# Patient Record
Sex: FEMALE | Race: WHITE | NOT HISPANIC OR LATINO | Employment: FULL TIME | URBAN - METROPOLITAN AREA
[De-identification: names, ages, dates, MRNs, and addresses within clinical notes are randomized per-mention and may not be internally consistent; named-entity substitution may affect disease eponyms.]

---

## 2017-08-01 ENCOUNTER — CONVERSION ENCOUNTER (OUTPATIENT)
Dept: MAMMOGRAPHY | Facility: HOSPITAL | Age: 52
End: 2017-08-01

## 2020-12-29 ENCOUNTER — OFFICE VISIT CONVERTED (OUTPATIENT)
Dept: FAMILY MEDICINE CLINIC | Facility: CLINIC | Age: 55
End: 2020-12-29
Attending: NURSE PRACTITIONER

## 2020-12-29 ENCOUNTER — HOSPITAL ENCOUNTER (OUTPATIENT)
Dept: LAB | Facility: HOSPITAL | Age: 55
Discharge: HOME | End: 2020-12-29
Attending: NURSE PRACTITIONER

## 2020-12-29 ENCOUNTER — CONVERSION ENCOUNTER (OUTPATIENT)
Dept: FAMILY MEDICINE CLINIC | Facility: CLINIC | Age: 55
End: 2020-12-29

## 2020-12-29 LAB
ALBUMIN SERPL-MCNC: 4.1 G/DL (ref 3.5–5)
ALBUMIN/GLOB SERPL: 1.2 {RATIO} (ref 1.4–2.6)
ALP SERPL-CCNC: 139 U/L (ref 53–141)
ALT SERPL-CCNC: 48 U/L (ref 10–40)
ANION GAP SERPL CALC-SCNC: 16 MMOL/L (ref 8–19)
AST SERPL-CCNC: 43 U/L (ref 15–50)
BASOPHILS # BLD AUTO: 0.06 10*3/UL (ref 0–0.2)
BASOPHILS NFR BLD AUTO: 0.7 % (ref 0–3)
BILIRUB SERPL-MCNC: 0.31 MG/DL (ref 0.2–1.3)
BUN SERPL-MCNC: 13 MG/DL (ref 5–25)
BUN/CREAT SERPL: 12 {RATIO} (ref 6–20)
CALCIUM SERPL-MCNC: 10.2 MG/DL (ref 8.7–10.4)
CHLORIDE SERPL-SCNC: 103 MMOL/L (ref 99–111)
CONV ABS IMM GRAN: 0.03 10*3/UL (ref 0–0.2)
CONV CO2: 24 MMOL/L (ref 22–32)
CONV IMMATURE GRAN: 0.4 % (ref 0–1.8)
CONV TOTAL PROTEIN: 7.6 G/DL (ref 6.3–8.2)
CREAT UR-MCNC: 1.05 MG/DL (ref 0.5–0.9)
DEPRECATED RDW RBC AUTO: 43 FL (ref 36.4–46.3)
EOSINOPHIL # BLD AUTO: 0.1 10*3/UL (ref 0–0.7)
EOSINOPHIL # BLD AUTO: 1.2 % (ref 0–7)
ERYTHROCYTE [DISTWIDTH] IN BLOOD BY AUTOMATED COUNT: 12 % (ref 11.7–14.4)
GFR SERPLBLD BASED ON 1.73 SQ M-ARVRAT: >60 ML/MIN/{1.73_M2}
GLOBULIN UR ELPH-MCNC: 3.5 G/DL (ref 2–3.5)
GLUCOSE SERPL-MCNC: 81 MG/DL (ref 65–99)
HCT VFR BLD AUTO: 47 % (ref 37–47)
HGB BLD-MCNC: 15.8 G/DL (ref 12–16)
LYMPHOCYTES # BLD AUTO: 1.99 10*3/UL (ref 1–5)
LYMPHOCYTES NFR BLD AUTO: 24.6 % (ref 20–45)
MCH RBC QN AUTO: 32.3 PG (ref 27–31)
MCHC RBC AUTO-ENTMCNC: 33.6 G/DL (ref 33–37)
MCV RBC AUTO: 96.1 FL (ref 81–99)
MONOCYTES # BLD AUTO: 0.73 10*3/UL (ref 0.2–1.2)
MONOCYTES NFR BLD AUTO: 9 % (ref 3–10)
NEUTROPHILS # BLD AUTO: 5.18 10*3/UL (ref 2–8)
NEUTROPHILS NFR BLD AUTO: 64.1 % (ref 30–85)
NRBC CBCN: 0 % (ref 0–0.7)
OSMOLALITY SERPL CALC.SUM OF ELEC: 287 MOSM/KG (ref 273–304)
PLATELET # BLD AUTO: 332 10*3/UL (ref 130–400)
PMV BLD AUTO: 10.4 FL (ref 9.4–12.3)
POTASSIUM SERPL-SCNC: 4.3 MMOL/L (ref 3.5–5.3)
RBC # BLD AUTO: 4.89 10*6/UL (ref 4.2–5.4)
SODIUM SERPL-SCNC: 139 MMOL/L (ref 135–147)
T4 FREE SERPL-MCNC: 1.2 NG/DL (ref 0.9–1.8)
TSH SERPL-ACNC: 4.23 M[IU]/L (ref 0.27–4.2)
WBC # BLD AUTO: 8.09 10*3/UL (ref 4.8–10.8)

## 2021-04-13 ENCOUNTER — OFFICE VISIT CONVERTED (OUTPATIENT)
Dept: FAMILY MEDICINE CLINIC | Facility: CLINIC | Age: 56
End: 2021-04-13
Attending: NURSE PRACTITIONER

## 2021-04-13 ENCOUNTER — CONVERSION ENCOUNTER (OUTPATIENT)
Dept: FAMILY MEDICINE CLINIC | Facility: CLINIC | Age: 56
End: 2021-04-13

## 2021-04-13 ENCOUNTER — HOSPITAL ENCOUNTER (OUTPATIENT)
Dept: LAB | Facility: HOSPITAL | Age: 56
Discharge: HOME | End: 2021-04-13
Attending: NURSE PRACTITIONER

## 2021-04-13 LAB
CHOLEST SERPL-MCNC: 221 MG/DL (ref 107–200)
CHOLEST/HDLC SERPL: 3.4 {RATIO} (ref 3–6)
HDLC SERPL-MCNC: 65 MG/DL (ref 40–60)
LDLC SERPL CALC-MCNC: 132 MG/DL (ref 70–100)
TRIGL SERPL-MCNC: 122 MG/DL (ref 40–150)
VLDLC SERPL-MCNC: 24 MG/DL (ref 5–37)

## 2021-04-14 ENCOUNTER — HOSPITAL ENCOUNTER (OUTPATIENT)
Dept: FAMILY MEDICINE CLINIC | Facility: CLINIC | Age: 56
Discharge: HOME | End: 2021-04-14
Attending: NURSE PRACTITIONER

## 2021-04-19 LAB
CONV LAST MENSTURAL PERIOD: NORMAL
SPECIMEN SOURCE: NORMAL
SPECIMEN SOURCE: NORMAL
THIN PREP CVX: NORMAL

## 2021-05-10 NOTE — H&P
"   History and Physical      Patient Name: Cher Quezada   Patient ID: [id number]   Sex: Female   YOB: 1965    Primary Care Provider: Theresa LOGAN   Referring Provider: Theresa LOGAN    Visit Date: December 29, 2020    Provider: DESMOND Haro   Location: [facility]   Location Address: [address] [place] [address], [address]  [place], [place]  [id number]   Location Phone: [phone]          Chief Complaint  · new patient/est care      History Of Present Illness  Cher Quezada is a 55 year old /White female who presents for evaluation and treatment of:      Patient is here today to establish care with a new provider. Patient is requesting r/fs on all of her medications. Patient would like to get scheduled for a pap smear.    Patient had part of  colon removed due to C Diff in 2014. dr. zamora , states \"he told me there was no reason to have a colonoscopy becuse there is practically nothing left\"     Patient's previous PCP was Nik Hernandez.    Patient has a hx of     depression/anxiety- Cymbalta (r/f), Wellbutrin XL (r/f) has been on this combo for 2 years, working well   Bipolar depression-Abilify (r/f)  Migraine- Maxalt   arthritis- flexeril  hypothyroidism- levothyroxine (r/f)    denies any SI/HI       Past Medical History  Disease Name Date Onset Notes   Allergic rhinitis, chronic --  --    Arthritis --  --    Bipolar Disorder --  --    Constipation --  --    Hypothyroidism --  --    Migraine --  --    Mood disorder --  --    Obesity --  --    Obstructive sleep apnea --  --          Past Surgical History  Procedure Name Date Notes   Colon --  abdominal colectomy   Fissurectomy --  --    Gastric Bypass 2006 --    Hernia --  --    Ileostomy reversal --  --    Rectal surgery 1994 fistula   Tubal ligation --  --          Medication List  Name Date Started Instructions   Abilify 10 mg oral tablet 12/29/2020 take 1 tablet (10 mg) by oral route once " daily for 90 days   acetaminophen 650 mg oral tablet extended release  --    cyclobenzaprine 10 mg oral tablet  take 1 tablet by oral route 3 times a day as needed   Cymbalta 60 mg oral capsule,delayed release(DR/EC) 12/29/2020 take 1 capsule (60 mg) by oral route once daily for 90 days   levothyroxine 50 mcg oral tablet 12/29/2020 take 1 tablet (50 mcg) by oral route once daily for 90 days   Maxalt 10 mg oral tablet  take 1 tablet (10 mg) by oral route once, may repeat at 2 hour intervals; do not exceed 30 mg in 24 hours   promethazine 50 mg oral tablet  take 1 tablet (50 mg) by oral route every 6 hours as needed   Wellbutrin  mg oral tablet extended release 24 hr 12/29/2020 take 1 tablet (150 mg) by oral route once daily for 90 days         Allergy List  Allergen Name Date Reaction Notes   Imitrex --  --  --    NSAIDS --  --  --    sumatriptan --  --  --        Allergies Reconciled  Family Medical History  Disease Name Relative/Age Notes   Diabetes, unspecified type Father/  Mother/   Mother; Father   Family history of breast cancer Mother/60s   Mother/60s   -Father's Family History Unknown Father/   Father   -Mother's Family History Unknown Mother/   Mother         Social History  Finding Status Start/Stop Quantity Notes   Alcohol Never --/-- --  drinks no   Caffeine Current every day --/-- --  drinks regularly; coffee and tea; 5 or more times per day  drinks regularly; coffee and tea; 3-4 times per day   Second hand smoke exposure Never --/-- --  no   Tobacco Never --/-- --  never a smoker         Review of Systems  · Constitutional  o Denies  o : fatigue  · Eyes  o Denies  o : blurred vision, changes in vision  · HENT  o Denies  o : headaches  · Cardiovascular  o Denies  o : chest pain, irregular heart beats, rapid heart rate, dyspnea on exertion  · Respiratory  o Denies  o : shortness of breath, wheezing, cough  · Gastrointestinal  o Denies  o : nausea, vomiting, diarrhea, constipation, abdominal pain,  "blood in stools, melena  · Genitourinary  o Denies  o : frequency, dysuria, hematuria  · Integument  o Denies  o : rash, new skin lesions  · Musculoskeletal  o Denies  o : joint pain, joint swelling, muscle pain  · Endocrine  o Admits  o : central obesity  o Denies  o : polyuria, polydipsia      Vitals  Date Time BP Position Site L\R Cuff Size HR RR TEMP (F) WT  HT  BMI kg/m2 BSA m2 O2 Sat FR L/min FiO2        12/29/2020 11:35 /79 Sitting    87 - R 18  311lbs 8oz 5'  3\" 55.18 2.51 98 %  21%          Physical Examination  · Constitutional  o Appearance  o : well developed, well-nourished, no acute distress  · Head and Face  o Head  o : normocephalic, atraumatic  · Ears, Nose, Mouth and Throat  o Ears  o :   § External Ears  § : external auditory canal appearance normal, no discharge present  § Otoscopic Examination  § : tympanic membranes pearly white/gray bilaterally  o Nose  o :   § External Nose  § : no lesions noted  § Nasopharynx  § : no discharge present  o Oral Cavity  o :   § Oral Mucosa  § : oral mucosa light pink  o Throat  o :   § Oropharynx  § : tonsils without exudate, no palatal petechiae  · Neck  o Inspection/Palpation  o : normal appearance, no masses or tenderness, trachea midline  o Thyroid  o : gland size normal, nontender, no nodules or masses present on palpation  · Respiratory  o Respiratory Effort  o : breathing unlabored  o Inspection of Chest  o : chest rise symmetric bilaterally  o Auscultation of Lungs  o : clear to auscultation bilaterally throughout inspiration and expiration  · Cardiovascular  o Heart  o :   § Auscultation of Heart  § : regular rate and rhythm, no murmurs, gallops or rubs  o Peripheral Vascular System  o :   § Extremities  § : no edema  · Lymphatic  o Neck  o : no cervical lymphadenopathy, no supraclavicular lymphadenopathy  · Psychiatric  o Mood and Affect  o : mood normal, affect appropriate          Assessment  · Screening for " depression     V79.0/Z13.89  · Hypothyroidism     244.9/E03.9  · Bipolar Disorder     296.40  · Migraine     346.10/G43.009  · Arthritis     716.90/M19.90  · High risk medication use     V58.69/Z79.899      Plan  · Orders  o Annual depression screening, 15 minutes (, 85814) - V79.0/Z13.89 - 12/29/2020  o Thyroid Profile (04168, 91978, THYII) - 244.9/E03.9 - 12/29/2020  o CBC with Auto Diff [facility] (27356) - 346.10/G43.009, 244.9/E03.9, V58.69/Z79.899 - 12/29/2020  o CMP [facility] (35675) - 346.10/G43.009, 244.9/E03.9, V58.69/Z79.899 - 12/29/2020  o ACO-39: Current medications updated and reviewed (, 1159F) - - 12/29/2020  o ACO-18: Positive screen for clinical depression using a standardized tool and a follow-up plan documented () - - 12/29/2020  o ACO-14: Influenza immunization administered or previously received [facility] () - - 12/29/2020  · Medications  o Abilify 10 mg oral tablet   SIG: take 1 tablet (10 mg) by oral route once daily for 90 days   DISP: (90) Tablet with 1 refills  Prescribed on 12/29/2020     o Cymbalta 60 mg oral capsule,delayed release(DR/EC)   SIG: take 1 capsule (60 mg) by oral route once daily for 90 days   DISP: (90) Capsule with 1 refills  Prescribed on 12/29/2020     o levothyroxine 50 mcg oral tablet   SIG: take 1 tablet (50 mcg) by oral route once daily for 90 days   DISP: (90) Tablet with 1 refills  Prescribed on 12/29/2020     o Wellbutrin  mg oral tablet extended release 24 hr   SIG: take 1 tablet (150 mg) by oral route once daily for 90 days   DISP: (90) Tablet with 1 refills  Prescribed on 12/29/2020     o Medications have been Reconciled  o Transition of Care or Provider Policy  · Instructions  o Depression Screen completed and scanned into the EMR under the designated folder within the patient's documents.  o Patient is taking medications as prescribed and doing well.   o Take all medications as prescribed/directed.  o Patient was educated/instructed on their  diagnosis, treatment and medications prior to discharge from the clinic today.  o Patient instructed to seek medical attention urgently for new or worsening symptoms.  o Call the office with any concerns or questions.  o Chronic conditions reviewed and taken into consideration for today's treatment plan.  o Discussed Covid-19 precautions including, but not limited to, social distancing, avoid touching your face, and hand washing.   · Disposition  o Follow Up in 6 months     pt will call back and sched pap             Electronically Signed by: DESMOND Haro -Author on December 29, 2020 03:21:20 PM

## 2021-05-14 VITALS
HEART RATE: 78 BPM | DIASTOLIC BLOOD PRESSURE: 78 MMHG | OXYGEN SATURATION: 99 % | HEIGHT: 63 IN | BODY MASS INDEX: 51.91 KG/M2 | RESPIRATION RATE: 18 BRPM | WEIGHT: 293 LBS | SYSTOLIC BLOOD PRESSURE: 139 MMHG

## 2021-05-14 VITALS
DIASTOLIC BLOOD PRESSURE: 79 MMHG | OXYGEN SATURATION: 98 % | HEIGHT: 63 IN | WEIGHT: 293 LBS | SYSTOLIC BLOOD PRESSURE: 130 MMHG | RESPIRATION RATE: 18 BRPM | HEART RATE: 87 BPM | BODY MASS INDEX: 51.91 KG/M2

## 2021-05-14 NOTE — PROGRESS NOTES
Progress Note      Patient Name: Cher Quezada   Patient ID: [id number]   Sex: Female   YOB: 1965    Primary Care Provider: Theresa LOGAN   Referring Provider: Theresa LOGAN    Visit Date: April 13, 2021    Provider: DESMOND Haro   [place] [facility]   [place] Address[place] [address] [place] [address], [address]  [place], [place]  [id number]   Location Phone: [phone]          Chief Complaint  · WWE      History Of Present Illness  Cher Quezada is a 56 year old /White female who presents for evaluation and treatment of:      Patient is here today for a WWE/pap smear.    Patient has had a flu vaccine during this season.    Patient is scheduled for the COVID-19 vaccine next week.    Last pap smear was July 2017.    Patient has no complaints.    Patient's last period is unknown, pt had ablation.    pt due for mammo - admits family hx breast CA in mother       Past Medical History  Disease Name Date Onset Notes   Allergic rhinitis, chronic --  --    Arthritis --  --    Bipolar Disorder --  --    Constipation --  --    Hypothyroidism --  --    Migraine --  --    Mood disorder --  --    Obesity --  --    Obstructive sleep apnea --  --          Past Surgical History  Procedure Name Date Notes   Colon --  abdominal colectomy   Fissurectomy --  --    Gastric Bypass 2006 --    Hernia --  --    Ileostomy reversal --  --    Rectal surgery 1994 fistula   Tubal ligation --  --          Medication List  Name Date Started Instructions   Abilify 10 mg oral tablet 12/29/2020 take 1 tablet (10 mg) by oral route once daily for 90 days   acetaminophen 650 mg oral tablet extended release  --    cyclobenzaprine 10 mg oral tablet  take 1 tablet by oral route 3 times a day as needed   Cymbalta 60 mg oral capsule,delayed release(DR/EC) 12/29/2020 take 1 capsule (60 mg) by oral route once daily for 90 days   levothyroxine 50 mcg oral tablet 12/29/2020 take 1 tablet  (50 mcg) by oral route once daily for 90 days   Maxalt 10 mg oral tablet  take 1 tablet (10 mg) by oral route once, may repeat at 2 hour intervals; do not exceed 30 mg in 24 hours   promethazine 50 mg oral tablet  take 1 tablet (50 mg) by oral route every 6 hours as needed   Wellbutrin  mg oral tablet extended release 24 hr 12/29/2020 take 1 tablet (150 mg) by oral route once daily for 90 days         Allergy List  Allergen Name Date Reaction Notes   Imitrex --  --  --    NSAIDS --  --  --    sumatriptan --  --  --        Allergies Reconciled  Family Medical History  Disease Name Relative/Age Notes   Diabetes, unspecified type Father/  Mother/   Mother; Father   Family history of breast cancer Mother/60s   Mother/60s   -Father's Family History Unknown Father/   Father   -Mother's Family History Unknown Mother/   Mother         Social History  Finding Status Start/Stop Quantity Notes   Alcohol Never --/-- --  drinks no   Caffeine Current every day --/-- --  drinks regularly; coffee and tea; 5 or more times per day  drinks regularly; coffee and tea; 3-4 times per day   Second hand smoke exposure Never --/-- --  no   Tobacco Never --/-- --  never a smoker         Immunizations  NameDate Admin Mfg Trade Name Lot Number Route Inj VIS Given VIS Publication   [id number]/29/2020 SKB Fluzone Quadrivalent  NE NE 12/29/2020    Comments: [employer] Prescription Shot         Review of Systems  · Constitutional  o Denies  o : fatigue  · Eyes  o Denies  o : blurred vision, changes in vision  · HENT  o Denies  o : headaches  · Cardiovascular  o Denies  o : chest pain, irregular heart beats, rapid heart rate, dyspnea on exertion  · Respiratory  o Denies  o : shortness of breath, wheezing, cough  · Gastrointestinal  o Denies  o : nausea, vomiting, diarrhea, constipation, abdominal pain, blood in stools, melena  · Genitourinary  o Denies  o : frequency, dysuria, hematuria  · Integument  o Denies  o : rash, new skin  "lesions  · Musculoskeletal  o Denies  o : joint pain, joint swelling, muscle pain  · Endocrine  o Admits  o : central obesity  o Denies  o : polyuria, polydipsia      Vitals  Date Time BP Position Site L\R Cuff Size HR RR TEMP (F) WT  HT  BMI kg/m2 BSA m2 O2 Sat FR L/min FiO2        04/13/2021 09:23 /78 Sitting    78 - R 18  310lbs 0oz 5'  3\" 54.91 2.5 99 %  21%          Physical Examination  · Constitutional  o Appearance  o : well developed, well-nourished, no acute distress  · Head and Face  o Head  o : normocephalic, atraumatic  · Ears, Nose, Mouth and Throat  o Ears  o :   § External Ears  § : external auditory canal appearance normal, no discharge present  § Otoscopic Examination  § : tympanic membranes pearly white/gray bilaterally  o Nose  o :   § External Nose  § : no lesions noted  § Nasopharynx  § : no discharge present  o Oral Cavity  o :   § Oral Mucosa  § : oral mucosa light pink  o Throat  o :   § Oropharynx  § : tonsils without exudate, no palatal petechiae  · Neck  o Inspection/Palpation  o : normal appearance, no masses or tenderness, trachea midline  o Range of Motion  o : cervical range of motion within normal limits  o Thyroid  o : gland size normal, nontender, no nodules or masses present on palpation  · Respiratory  o Respiratory Effort  o : breathing unlabored  o Inspection of Chest  o : chest rise symmetric bilaterally  o Auscultation of Lungs  o : clear to auscultation bilaterally throughout inspiration and expiration  · Cardiovascular  o Heart  o :   § Auscultation of Heart  § : regular rate and rhythm, no murmurs, gallops or rubs  o Peripheral Vascular System  o :   § Carotid Arteries  § : normal pulses bilaterally, no bruits present  § Pedal Pulses  § : pulses 2 bilaterally  § Extremities  § : no edema  · Breasts  o Inspection of Breasts  o : breasts symmetrical, no skin changes, no deformities present, no discharge present  o Palpation of Breasts, Axillae  o : no masses present " on palpation, no breast tenderness  · Gastrointestinal  o Abdominal Examination  o : abdomen obese, nontender to palpation, tone normal without rigidity or guarding, no masses present, normal bowel sounds  · Genitourinary  o External Genitalia  o : no inflammation, no lesions present  o Vagina  o : normal vaginal vault, no discharge present, no inflammatory lesions present, no masses present  o Urethra  o :   § Urethral Meatus  § : no inflammation present  § Urethral Body  § : urethra palpation normal  o Bladder  o : nontender to palpation  o Cervix  o : only able to partially visualize due to pt discomfort with exam, appearance healthy, no lesions present, nontender to palpation, no discharges, no bleeding present, normal midline position  o Uterus  o : nontender to palpation, no masses present, position midline/midplane  o Adnexa  o : no tenderness or masses present on bimanual examination  o Anus  o : no inflammation or lesions present  o Perineum  o : perineum within normal limits  · Lymphatic  o Neck  o : no cervical lymphadenopathy, no supraclavicular lymphadenopathy  o Axilla  o : no lymphadenopathy present  o Groin  o : no lymphadenopathy present  · Neurologic  o Mental Status Examination  o :   § Orientation  § : grossly oriented to person, place and time  o Gait and Station  o : normal gait, able to stand without difficulty  · Psychiatric  o Judgement and Insight  o : judgment and insight intact  o Mood and Affect  o : mood normal, affect appropriate          Assessment  · Screening for lipid disorders     V77.91/Z13.220  · Screening for cervical cancer     V76.2/Z12.4  · Visit for screening mammogram     V76.12/Z12.31  · Pap Smear     V76.2/Z01.419  · Well woman exam     V72.31/Z01.419    Problems Reconciled  Plan  · Orders  o Lipid Panel [facility] (47210) - V77.91/Z13.220 - 04/13/2021  o Pap smear (17133) - V76.2/Z12.4 - 04/13/2021  o Screening Mammography; Bilateral 3D (57372, 20415, ) - V76.12/Z12.31 -  04/13/2021  o ACO-14: Influenza immunization administered or previously received [facility] () - - 04/13/2021  o ACO-39: Current medications updated and reviewed (1159F, ) - - 04/13/2021  · Medications  o Medications have been Reconciled  o Transition of Care or Provider Policy  · Instructions  o **Pap Test/Liquid Based:   o Thin Prep  o Source:   o Cervix  o ********  o **Perform Reflex Human Papilloma Virus (HPV) High Risk on this Pap (If atypical squamous cells of the undetermined signifigcance (ASCUS)/Atypical Glandular Cells of undetermined significance (AGCUS): Low Grade Squamous Intraepitheal lesion (LGSIL): **  o **Is this an annual PAP:  o Yes  o Patient is taking medications as prescribed and doing well.   o Patient was educated/instructed on their diagnosis, treatment and medications prior to discharge from the clinic today.  o Patient instructed to seek medical attention urgently for new or worsening symptoms.  o Call the office with any concerns or questions.  o Chronic conditions reviewed and taken into consideration for today's treatment plan.  · Disposition  o Call or Return if symptoms worsen or persist.  o Follow Up PRN            Electronically Signed by: DESMOND Haro -Author on April 13, 2021 03:33:32 PM

## 2021-05-22 ENCOUNTER — TRANSCRIBE ORDERS (OUTPATIENT)
Dept: FAMILY MEDICINE CLINIC | Facility: CLINIC | Age: 56
End: 2021-05-22

## 2021-05-22 DIAGNOSIS — Z12.31 ENCOUNTER FOR SCREENING MAMMOGRAM FOR MALIGNANT NEOPLASM OF BREAST: Primary | ICD-10-CM

## 2021-07-23 ENCOUNTER — HOSPITAL ENCOUNTER (OUTPATIENT)
Dept: MAMMOGRAPHY | Facility: HOSPITAL | Age: 56
Discharge: HOME | End: 2021-07-23
Admitting: NURSE PRACTITIONER

## 2021-07-23 DIAGNOSIS — Z12.31 ENCOUNTER FOR SCREENING MAMMOGRAM FOR MALIGNANT NEOPLASM OF BREAST: ICD-10-CM

## 2021-07-23 PROCEDURE — 77067 SCR MAMMO BI INCL CAD: CPT

## 2021-07-23 PROCEDURE — 77063 BREAST TOMOSYNTHESIS BI: CPT

## 2021-07-26 ENCOUNTER — TELEPHONE (OUTPATIENT)
Dept: FAMILY MEDICINE CLINIC | Facility: CLINIC | Age: 56
End: 2021-07-26

## 2021-08-09 DIAGNOSIS — F32.A DEPRESSION, UNSPECIFIED DEPRESSION TYPE: ICD-10-CM

## 2021-08-09 DIAGNOSIS — E03.9 HYPOTHYROIDISM, UNSPECIFIED TYPE: Primary | ICD-10-CM

## 2021-08-10 RX ORDER — BUPROPION HYDROCHLORIDE 150 MG/1
TABLET ORAL
Qty: 30 TABLET | Refills: 0 | OUTPATIENT
Start: 2021-08-10

## 2021-08-10 RX ORDER — LEVOTHYROXINE SODIUM 0.05 MG/1
TABLET ORAL
Qty: 30 TABLET | Refills: 0 | OUTPATIENT
Start: 2021-08-10

## 2021-08-10 RX ORDER — ARIPIPRAZOLE 10 MG/1
TABLET ORAL
Qty: 30 TABLET | Refills: 0 | OUTPATIENT
Start: 2021-08-10

## 2021-08-10 RX ORDER — DULOXETIN HYDROCHLORIDE 60 MG/1
CAPSULE, DELAYED RELEASE ORAL
Qty: 30 CAPSULE | Refills: 0 | OUTPATIENT
Start: 2021-08-10

## 2021-08-11 DIAGNOSIS — F33.0 MAJOR DEPRESSIVE DISORDER, RECURRENT, MILD (HCC): Primary | ICD-10-CM

## 2021-08-11 DIAGNOSIS — E03.9 HYPOTHYROIDISM, UNSPECIFIED TYPE: ICD-10-CM

## 2021-08-11 RX ORDER — PROMETHAZINE HCL 50 MG
50 TABLET ORAL DAILY PRN
COMMUNITY
End: 2022-02-01

## 2021-08-11 RX ORDER — ARIPIPRAZOLE 10 MG/1
10 TABLET ORAL DAILY
Qty: 30 TABLET | Refills: 0 | Status: SHIPPED | OUTPATIENT
Start: 2021-08-11 | End: 2021-08-18 | Stop reason: SDUPTHER

## 2021-08-11 RX ORDER — LORATADINE 10 MG/1
1 TABLET ORAL DAILY
COMMUNITY

## 2021-08-11 RX ORDER — DULOXETIN HYDROCHLORIDE 60 MG/1
60 CAPSULE, DELAYED RELEASE ORAL DAILY
Qty: 30 CAPSULE | Refills: 0 | Status: SHIPPED | OUTPATIENT
Start: 2021-08-11 | End: 2021-08-18 | Stop reason: SDUPTHER

## 2021-08-11 RX ORDER — BUPROPION HYDROCHLORIDE 150 MG/1
150 TABLET ORAL DAILY
COMMUNITY
End: 2021-08-11 | Stop reason: SDUPTHER

## 2021-08-11 RX ORDER — DULOXETIN HYDROCHLORIDE 60 MG/1
60 CAPSULE, DELAYED RELEASE ORAL DAILY
COMMUNITY
End: 2021-08-11 | Stop reason: SDUPTHER

## 2021-08-11 RX ORDER — RIZATRIPTAN BENZOATE 10 MG/1
10 TABLET ORAL DAILY PRN
COMMUNITY
End: 2022-08-02

## 2021-08-11 RX ORDER — LEVOTHYROXINE SODIUM 0.05 MG/1
50 TABLET ORAL DAILY
Qty: 30 TABLET | Refills: 0 | Status: SHIPPED | OUTPATIENT
Start: 2021-08-11 | End: 2021-08-18 | Stop reason: SDUPTHER

## 2021-08-11 RX ORDER — UREA 10 %
50 LOTION (ML) TOPICAL DAILY
COMMUNITY

## 2021-08-11 RX ORDER — ARIPIPRAZOLE 10 MG/1
10 TABLET ORAL DAILY
COMMUNITY
End: 2021-08-11 | Stop reason: SDUPTHER

## 2021-08-11 RX ORDER — CYCLOBENZAPRINE HCL 10 MG
10 TABLET ORAL DAILY
COMMUNITY
End: 2022-08-02

## 2021-08-11 RX ORDER — BUPROPION HYDROCHLORIDE 150 MG/1
150 TABLET ORAL DAILY
Qty: 30 TABLET | Refills: 0 | Status: SHIPPED | OUTPATIENT
Start: 2021-08-11 | End: 2021-08-18 | Stop reason: SDUPTHER

## 2021-08-11 RX ORDER — LEVOTHYROXINE SODIUM 0.05 MG/1
50 TABLET ORAL DAILY
COMMUNITY
End: 2021-08-11 | Stop reason: SDUPTHER

## 2021-08-18 ENCOUNTER — LAB (OUTPATIENT)
Dept: LAB | Facility: HOSPITAL | Age: 56
End: 2021-08-18

## 2021-08-18 ENCOUNTER — OFFICE VISIT (OUTPATIENT)
Dept: FAMILY MEDICINE CLINIC | Facility: CLINIC | Age: 56
End: 2021-08-18

## 2021-08-18 VITALS
DIASTOLIC BLOOD PRESSURE: 75 MMHG | WEIGHT: 293 LBS | RESPIRATION RATE: 16 BRPM | SYSTOLIC BLOOD PRESSURE: 132 MMHG | BODY MASS INDEX: 51.91 KG/M2 | OXYGEN SATURATION: 97 % | HEIGHT: 63 IN | HEART RATE: 93 BPM

## 2021-08-18 DIAGNOSIS — Z53.20 COLON CANCER SCREENING DECLINED: ICD-10-CM

## 2021-08-18 DIAGNOSIS — E03.9 HYPOTHYROIDISM, UNSPECIFIED TYPE: Primary | ICD-10-CM

## 2021-08-18 DIAGNOSIS — F33.0 MAJOR DEPRESSIVE DISORDER, RECURRENT, MILD (HCC): ICD-10-CM

## 2021-08-18 LAB
T4 FREE SERPL-MCNC: 1.22 NG/DL (ref 0.93–1.7)
TSH SERPL DL<=0.05 MIU/L-ACNC: 4.39 UIU/ML (ref 0.27–4.2)

## 2021-08-18 PROCEDURE — 36415 COLL VENOUS BLD VENIPUNCTURE: CPT | Performed by: NURSE PRACTITIONER

## 2021-08-18 PROCEDURE — 84443 ASSAY THYROID STIM HORMONE: CPT | Performed by: NURSE PRACTITIONER

## 2021-08-18 PROCEDURE — 99214 OFFICE O/P EST MOD 30 MIN: CPT | Performed by: NURSE PRACTITIONER

## 2021-08-18 PROCEDURE — 84439 ASSAY OF FREE THYROXINE: CPT | Performed by: NURSE PRACTITIONER

## 2021-08-18 RX ORDER — LEVOTHYROXINE SODIUM 0.05 MG/1
50 TABLET ORAL DAILY
Qty: 30 TABLET | Refills: 1 | Status: SHIPPED | OUTPATIENT
Start: 2021-08-18 | End: 2021-08-19

## 2021-08-18 RX ORDER — DULOXETIN HYDROCHLORIDE 60 MG/1
60 CAPSULE, DELAYED RELEASE ORAL DAILY
Qty: 30 CAPSULE | Refills: 1 | Status: SHIPPED | OUTPATIENT
Start: 2021-08-18 | End: 2021-11-04 | Stop reason: SDUPTHER

## 2021-08-18 RX ORDER — BUPROPION HYDROCHLORIDE 150 MG/1
150 TABLET ORAL DAILY
Qty: 30 TABLET | Refills: 1 | Status: SHIPPED | OUTPATIENT
Start: 2021-08-18 | End: 2021-11-04 | Stop reason: SDUPTHER

## 2021-08-18 RX ORDER — ARIPIPRAZOLE 10 MG/1
10 TABLET ORAL DAILY
Qty: 30 TABLET | Refills: 1 | Status: SHIPPED | OUTPATIENT
Start: 2021-08-18 | End: 2021-11-04 | Stop reason: SDUPTHER

## 2021-08-18 NOTE — PROGRESS NOTES
Chief Complaint  Anxiety, Depression, and Hypothyroidism    Subjective          Cher Quezada presents to [facility] for   History of Present Illness    Patient is here for a follow up on depression, anxiety, hypothyroidism. Patient is requesting r/fs on medications.  States is doing well on all medications.    Discussed colorectal screening with patient : pt states hx of C-diff that required removal of colon, per pt report the surgeon told her she would not need colonoscopy's because she has been no colon Left.  So pt declines.   Medical History  Past Medical History:   Diagnosis Date   • Arthritis    • Bipolar disorder (CMS/HCC)    • Chronic allergic rhinitis    • Constipation    • Hypothyroidism    • Migraine    • Mood disorder (CMS/HCC)    • Obesity    • Obstructive sleep apnea      Surgical History  Past Surgical History:   Procedure Laterality Date   • ANAL FISSURECTOMY     • COLON SURGERY      ABDOMINAL   • GASTRIC BYPASS  2006   • HERNIA REPAIR     • ILEOSTOMY REVISION      REVERSAL   • RECTAL SURGERY  1994    FISTULA   • TUBAL ABDOMINAL LIGATION       Social History  Social History     Socioeconomic History   • Marital status:      Spouse name: Not on file   • Number of children: Not on file   • Years of education: Not on file   • Highest education level: Not on file   Tobacco Use   • Smoking status: Never Smoker   • Smokeless tobacco: Never Used   • Tobacco comment: NO SECOND HAND SMOKE EXPOSURE   Vaping Use   • Vaping Use: Never used   Substance and Sexual Activity   • Alcohol use: Never   • Drug use: Never   • Sexual activity: Defer       Current Outpatient Medications:   •  ARIPiprazole (ABILIFY) 10 MG tablet, Take 1 tablet by mouth Daily., Disp: 30 tablet, Rfl: 1  •  buPROPion XL (WELLBUTRIN XL) 150 MG 24 hr tablet, Take 1 tablet by mouth Daily., Disp: 30 tablet, Rfl: 1  •  Cholecalciferol 25 MCG (1000 UT) capsule, Take 1,000 Units by mouth Daily., Disp:  ", Rfl:   •  Cyanocobalamin ER 2000 MCG tablet controlled-release, Take 2,000 mcg by mouth Daily., Disp: , Rfl:   •  cyclobenzaprine (FLEXERIL) 10 MG tablet, Take 10 mg by mouth Daily., Disp: , Rfl:   •  DULoxetine (CYMBALTA) 60 MG capsule, Take 1 capsule by mouth Daily., Disp: 30 capsule, Rfl: 1  •  levothyroxine (SYNTHROID, LEVOTHROID) 50 MCG tablet, Take 1 tablet by mouth Daily., Disp: 30 tablet, Rfl: 1  •  loratadine (CLARITIN) 10 MG tablet, Take 1 tablet by mouth Daily., Disp: , Rfl:   •  promethazine (PHENERGAN) 50 MG tablet, Take 50 mg by mouth Daily As Needed., Disp: , Rfl:   •  rizatriptan (Maxalt) 10 MG tablet, Take 10 mg by mouth Daily As Needed., Disp: , Rfl:   •  Zinc Sulfate 220 (50 Zn) MG tablet, Take 50 mg by mouth Daily., Disp: , Rfl:     Review of Systems     Objective     /75 (BP Location: Left arm, Patient Position: Sitting, Cuff Size: Adult)   Pulse 93   Resp 16   Ht 160 cm (63\")   Wt (!) 142 kg (313 lb 12.8 oz)   SpO2 97%   BMI 55.59 kg/m²     Body mass index is 55.59 kg/m².    Physical Exam  Vitals reviewed.   Constitutional:       Appearance: Normal appearance. She is well-developed.   HENT:      Head: Normocephalic and atraumatic.      Right Ear: External ear normal.      Left Ear: External ear normal.   Eyes:      Conjunctiva/sclera: Conjunctivae normal.      Pupils: Pupils are equal, round, and reactive to light.   Cardiovascular:      Rate and Rhythm: Normal rate and regular rhythm.      Heart sounds: No murmur heard.   No friction rub. No gallop.    Pulmonary:      Effort: Pulmonary effort is normal.      Breath sounds: Normal breath sounds. No wheezing or rhonchi.   Skin:     General: Skin is warm and dry.   Neurological:      Mental Status: She is alert and oriented to person, place, and time.      Cranial Nerves: No cranial nerve deficit.   Psychiatric:         Mood and Affect: Mood and affect normal.         Behavior: Behavior normal.         Thought Content: Thought " content normal.         Judgment: Judgment normal.         Result Review :     The following data was reviewed by: DESMOND Haro on 08/18/2021:    CMP    CMP 12/29/20   Glucose 81   BUN 13   Creatinine 1.05 (A)   Sodium 139   Potassium 4.3   Chloride 103   Calcium 10.2   Albumin 4.1   Total Bilirubin 0.31   Alkaline Phosphatase 139   AST (SGOT) 43   ALT (SGPT) 48 (A)   (A) Abnormal value            CBC    CBC 12/29/20   WBC 8.09   RBC 4.89   Hemoglobin 15.8   Hematocrit 47.0   MCV 96.1   MCH 32.3 (A)   MCHC 33.6   RDW 12.0   Platelets 332   (A) Abnormal value            Lipid Panel    Lipid Panel 4/13/21   Total Cholesterol 221 (A)   Triglycerides 122   HDL Cholesterol 65 (A)   VLDL Cholesterol 24   LDL Cholesterol  132 (A)   (A) Abnormal value       Comments are available for some flowsheets but are not being displayed.           TSH    TSH 12/29/20   TSH 4.230 (A)   (A) Abnormal value                               Assessment:  Diagnoses and all orders for this visit:    1. Hypothyroidism, unspecified type (Primary)  Assessment & Plan:  Patient states she is doing well, however she has not had labs since December 2020, at that time TSH was very mildly elevated.  We will refill current dose of Synthroid 50 mcg and recheck thyroid levels today.    Orders:  -     levothyroxine (SYNTHROID, LEVOTHROID) 50 MCG tablet; Take 1 tablet by mouth Daily.  Dispense: 30 tablet; Refill: 1  -     TSH+Free T4    2. Major depressive disorder, recurrent, mild (CMS/HCC)  Assessment & Plan:  Patient's depression is recurrent and is mild without psychosis. Their depression is currently in full remission and the condition is Stable, well controlled. This will be reassessed at the next regular appointment. F/U as described:patient will continue current medication therapy.    Orders:  -     ARIPiprazole (ABILIFY) 10 MG tablet; Take 1 tablet by mouth Daily.  Dispense: 30 tablet; Refill: 1  -     buPROPion XL (WELLBUTRIN XL) 150 MG  24 hr tablet; Take 1 tablet by mouth Daily.  Dispense: 30 tablet; Refill: 1  -     DULoxetine (CYMBALTA) 60 MG capsule; Take 1 capsule by mouth Daily.  Dispense: 30 capsule; Refill: 1    3. Colon cancer screening declined              Follow Up     No follow-ups on file.    Patient was given instructions and counseling regarding her condition or for health maintenance advice. Please see specific information pulled into the AVS if appropriate.     Theresa Chacon, DESMOND  08/18/2021

## 2021-08-18 NOTE — ASSESSMENT & PLAN NOTE
Patient's depression is recurrent and is mild without psychosis. Their depression is currently in full remission and the condition is Stable, well controlled. This will be reassessed at the next regular appointment. F/U as described:patient will continue current medication therapy.

## 2021-08-18 NOTE — ASSESSMENT & PLAN NOTE
Patient states she is doing well, however she has not had labs since December 2020, at that time TSH was very mildly elevated.  We will refill current dose of Synthroid 50 mcg and recheck thyroid levels today.

## 2021-08-19 ENCOUNTER — TELEPHONE (OUTPATIENT)
Dept: FAMILY MEDICINE CLINIC | Facility: CLINIC | Age: 56
End: 2021-08-19

## 2021-08-19 DIAGNOSIS — E03.9 HYPOTHYROIDISM, UNSPECIFIED TYPE: Primary | ICD-10-CM

## 2021-08-19 RX ORDER — LEVOTHYROXINE SODIUM 0.07 MG/1
75 TABLET ORAL DAILY
Qty: 30 TABLET | Refills: 1 | Status: SHIPPED | OUTPATIENT
Start: 2021-08-19 | End: 2021-10-07 | Stop reason: SDUPTHER

## 2021-09-30 ENCOUNTER — LAB (OUTPATIENT)
Dept: LAB | Facility: HOSPITAL | Age: 56
End: 2021-09-30

## 2021-09-30 DIAGNOSIS — E03.9 HYPOTHYROIDISM, UNSPECIFIED TYPE: ICD-10-CM

## 2021-09-30 LAB
T4 FREE SERPL-MCNC: 1.24 NG/DL (ref 0.93–1.7)
TSH SERPL DL<=0.05 MIU/L-ACNC: 2.85 UIU/ML (ref 0.27–4.2)

## 2021-09-30 PROCEDURE — 84439 ASSAY OF FREE THYROXINE: CPT

## 2021-09-30 PROCEDURE — 36415 COLL VENOUS BLD VENIPUNCTURE: CPT

## 2021-09-30 PROCEDURE — 84443 ASSAY THYROID STIM HORMONE: CPT

## 2021-10-07 ENCOUNTER — TELEPHONE (OUTPATIENT)
Dept: FAMILY MEDICINE CLINIC | Facility: CLINIC | Age: 56
End: 2021-10-07

## 2021-10-07 DIAGNOSIS — E03.9 HYPOTHYROIDISM, UNSPECIFIED TYPE: ICD-10-CM

## 2021-10-07 RX ORDER — LEVOTHYROXINE SODIUM 0.07 MG/1
75 TABLET ORAL DAILY
Qty: 90 TABLET | Refills: 1 | Status: SHIPPED | OUTPATIENT
Start: 2021-10-07 | End: 2022-01-31 | Stop reason: SDUPTHER

## 2021-11-04 ENCOUNTER — TELEPHONE (OUTPATIENT)
Dept: FAMILY MEDICINE CLINIC | Facility: CLINIC | Age: 56
End: 2021-11-04

## 2021-11-04 DIAGNOSIS — E03.9 HYPOTHYROIDISM, UNSPECIFIED TYPE: ICD-10-CM

## 2021-11-04 DIAGNOSIS — F33.0 MAJOR DEPRESSIVE DISORDER, RECURRENT, MILD (HCC): ICD-10-CM

## 2021-11-04 RX ORDER — LEVOTHYROXINE SODIUM 0.07 MG/1
75 TABLET ORAL DAILY
Qty: 90 TABLET | Refills: 1 | Status: CANCELLED | OUTPATIENT
Start: 2021-11-04

## 2021-11-04 NOTE — TELEPHONE ENCOUNTER
Caller: Cher Quezada    Relationship: Self        Requested Prescriptions:   Requested Prescriptions     Pending Prescriptions Disp Refills   • levothyroxine (Synthroid) 75 MCG tablet 90 tablet 1     Sig: Take 1 tablet by mouth Daily.   • DULoxetine (CYMBALTA) 60 MG capsule 30 capsule 1     Sig: Take 1 capsule by mouth Daily.   • buPROPion XL (WELLBUTRIN XL) 150 MG 24 hr tablet 30 tablet 1     Sig: Take 1 tablet by mouth Daily.   • ARIPiprazole (ABILIFY) 10 MG tablet 30 tablet 1     Sig: Take 1 tablet by mouth Daily.        Pharmacy where request should be sent: [facility]   [address] [place] [address]   [phone]     Additional details provided by patient: PATIENT IS NEEDING A REFILL. PATIENT IS WANTING TO GET A 90 DAY SUPPLY.     Best call back number: [phone]     Does the patient have less than a 3 day supply:  [] Yes  [x] No    Milton Man Rep   11/04/21 12:57 EDT

## 2021-11-04 NOTE — TELEPHONE ENCOUNTER
Levothyroxine was the only prescription that was sent in for 90 days with a refill. The rest were sent in on 08/18/2021 for 30 days with 1 refill. I put in for 90 but if you want a refill please add

## 2021-11-08 RX ORDER — DULOXETIN HYDROCHLORIDE 60 MG/1
60 CAPSULE, DELAYED RELEASE ORAL DAILY
Qty: 90 CAPSULE | Refills: 0 | Status: SHIPPED | OUTPATIENT
Start: 2021-11-08 | End: 2022-01-31 | Stop reason: SDUPTHER

## 2021-11-08 RX ORDER — ARIPIPRAZOLE 10 MG/1
10 TABLET ORAL DAILY
Qty: 90 TABLET | Refills: 0 | Status: SHIPPED | OUTPATIENT
Start: 2021-11-08 | End: 2022-01-31 | Stop reason: SDUPTHER

## 2021-11-08 RX ORDER — BUPROPION HYDROCHLORIDE 150 MG/1
150 TABLET ORAL DAILY
Qty: 90 TABLET | Refills: 0 | Status: SHIPPED | OUTPATIENT
Start: 2021-11-08 | End: 2022-01-31 | Stop reason: SDUPTHER

## 2022-01-31 RX ORDER — HYDROCODONE BITARTRATE AND ACETAMINOPHEN 7.5; 325 MG/1; MG/1
TABLET ORAL
COMMUNITY
End: 2022-02-01

## 2022-01-31 RX ORDER — GABAPENTIN 100 MG/1
CAPSULE ORAL
COMMUNITY
End: 2022-02-01

## 2022-02-01 ENCOUNTER — OFFICE VISIT (OUTPATIENT)
Dept: FAMILY MEDICINE CLINIC | Facility: CLINIC | Age: 57
End: 2022-02-01

## 2022-02-01 VITALS
DIASTOLIC BLOOD PRESSURE: 74 MMHG | OXYGEN SATURATION: 97 % | SYSTOLIC BLOOD PRESSURE: 128 MMHG | HEART RATE: 84 BPM | HEIGHT: 63 IN | BODY MASS INDEX: 51.91 KG/M2 | WEIGHT: 293 LBS

## 2022-02-01 DIAGNOSIS — Z13.220 SCREENING FOR LIPID DISORDERS: ICD-10-CM

## 2022-02-01 DIAGNOSIS — F33.0 MAJOR DEPRESSIVE DISORDER, RECURRENT, MILD: ICD-10-CM

## 2022-02-01 DIAGNOSIS — Z01.89 ROUTINE LAB DRAW: ICD-10-CM

## 2022-02-01 DIAGNOSIS — E03.9 HYPOTHYROIDISM, UNSPECIFIED TYPE: Primary | ICD-10-CM

## 2022-02-01 DIAGNOSIS — Z11.59 NEED FOR HEPATITIS C SCREENING TEST: ICD-10-CM

## 2022-02-01 DIAGNOSIS — Z53.20 COLON CANCER SCREENING DECLINED: ICD-10-CM

## 2022-02-01 PROCEDURE — 99214 OFFICE O/P EST MOD 30 MIN: CPT | Performed by: NURSE PRACTITIONER

## 2022-02-01 RX ORDER — LEVOTHYROXINE SODIUM 0.07 MG/1
75 TABLET ORAL DAILY
Qty: 30 TABLET | Refills: 5 | Status: SHIPPED | OUTPATIENT
Start: 2022-02-01 | End: 2022-08-02 | Stop reason: SDUPTHER

## 2022-02-01 RX ORDER — BUPROPION HYDROCHLORIDE 150 MG/1
150 TABLET ORAL DAILY
Qty: 30 TABLET | Refills: 5 | Status: SHIPPED | OUTPATIENT
Start: 2022-02-01 | End: 2022-08-02 | Stop reason: SDUPTHER

## 2022-02-01 RX ORDER — DULOXETIN HYDROCHLORIDE 60 MG/1
60 CAPSULE, DELAYED RELEASE ORAL DAILY
Qty: 30 CAPSULE | Refills: 5 | Status: SHIPPED | OUTPATIENT
Start: 2022-02-01 | End: 2022-08-02 | Stop reason: SDUPTHER

## 2022-02-01 RX ORDER — ARIPIPRAZOLE 10 MG/1
10 TABLET ORAL DAILY
Qty: 30 TABLET | Refills: 5 | Status: SHIPPED | OUTPATIENT
Start: 2022-02-01 | End: 2022-08-02 | Stop reason: SDUPTHER

## 2022-02-01 NOTE — ASSESSMENT & PLAN NOTE
Patient states that she has had extensive colon surgery in for general told her that she did not need any colonoscopies due to the extent of her surgeries.

## 2022-02-01 NOTE — PROGRESS NOTES
Chief Complaint  Follow-up thyroid, depression, migraines  Subjective          Cher Quezada presents to [facility] for   History of Present Illness    Patient presents today to follow-up on depression (wellbutrin, cymbalta, abilify ), Hypothyroidism (levothyroxine), Allergies (claritin), and Migraine (maxalt as needed ).  States she is doing well overall, does need refills.  Also due for labs.  Medical History  Past Medical History:   Diagnosis Date   • Arthritis    • Bipolar disorder (HCC)    • Chronic allergic rhinitis    • Constipation    • Hypothyroidism    • Migraine    • Mood disorder (HCC)    • Obesity    • Obstructive sleep apnea      Surgical History  Past Surgical History:   Procedure Laterality Date   • ANAL FISSURECTOMY     • COLON SURGERY      ABDOMINAL   • GASTRIC BYPASS  2006   • HERNIA REPAIR     • ILEOSTOMY REVISION      REVERSAL   • RECTAL SURGERY  1994    FISTULA   • TUBAL ABDOMINAL LIGATION       Social History  Social History     Socioeconomic History   • Marital status:    Tobacco Use   • Smoking status: Never Smoker   • Smokeless tobacco: Never Used   • Tobacco comment: NO SECOND HAND SMOKE EXPOSURE   Vaping Use   • Vaping Use: Never used   Substance and Sexual Activity   • Alcohol use: Never   • Drug use: Never   • Sexual activity: Defer       Current Outpatient Medications:   •  Cholecalciferol 25 MCG (1000 UT) capsule, Take 1,000 Units by mouth Daily., Disp: , Rfl:   •  Cyanocobalamin ER 2000 MCG tablet controlled-release, Take 2,000 mcg by mouth Daily., Disp: , Rfl:   •  cyclobenzaprine (FLEXERIL) 10 MG tablet, Take 10 mg by mouth Daily., Disp: , Rfl:   •  loratadine (CLARITIN) 10 MG tablet, Take 1 tablet by mouth Daily., Disp: , Rfl:   •  rizatriptan (Maxalt) 10 MG tablet, Take 10 mg by mouth Daily As Needed., Disp: , Rfl:   •  Zinc Sulfate 220 (50 Zn) MG tablet, Take 50 mg by mouth Daily., Disp: , Rfl:   •  ARIPiprazole (ABILIFY) 10 MG  "tablet, Take 1 tablet by mouth Daily., Disp: 30 tablet, Rfl: 5  •  buPROPion XL (WELLBUTRIN XL) 150 MG 24 hr tablet, Take 1 tablet by mouth Daily., Disp: 30 tablet, Rfl: 5  •  DULoxetine (CYMBALTA) 60 MG capsule, Take 1 capsule by mouth Daily., Disp: 30 capsule, Rfl: 5  •  levothyroxine (Synthroid) 75 MCG tablet, Take 1 tablet by mouth Daily., Disp: 30 tablet, Rfl: 5    Review of Systems     Objective     /74   Pulse 84   Ht 160 cm (63\")   Wt (!) 142 kg (313 lb)   SpO2 97%   BMI 55.45 kg/m²     Body mass index is 55.45 kg/m².    Physical Exam  Vitals reviewed.   Constitutional:       Appearance: Normal appearance. She is well-developed. She is obese.   HENT:      Head: Normocephalic and atraumatic.      Right Ear: External ear normal.      Left Ear: External ear normal.   Eyes:      Conjunctiva/sclera: Conjunctivae normal.      Pupils: Pupils are equal, round, and reactive to light.   Cardiovascular:      Rate and Rhythm: Normal rate and regular rhythm.      Heart sounds: No murmur heard.  No friction rub. No gallop.    Pulmonary:      Effort: Pulmonary effort is normal.      Breath sounds: Normal breath sounds. No wheezing or rhonchi.   Skin:     General: Skin is warm and dry.   Neurological:      Mental Status: She is alert and oriented to person, place, and time.      Cranial Nerves: No cranial nerve deficit.   Psychiatric:         Mood and Affect: Mood and affect normal.         Behavior: Behavior normal.         Thought Content: Thought content normal.         Judgment: Judgment normal.         Result Review :     The following data was reviewed by: DESMOND Haro on 02/01/2022:            Lipid Panel    Lipid Panel 4/13/21   Total Cholesterol 221 (A)   Triglycerides 122   HDL Cholesterol 65 (A)   VLDL Cholesterol 24   LDL Cholesterol  132 (A)   (A) Abnormal value       Comments are available for some flowsheets but are not being displayed.           TSH    TSH 8/18/21 9/30/21   TSH 4.390 (A) " 2.850   (A) Abnormal value                               Assessment:  Diagnoses and all orders for this visit:    1. Hypothyroidism, unspecified type (Primary)  -     levothyroxine (Synthroid) 75 MCG tablet; Take 1 tablet by mouth Daily.  Dispense: 30 tablet; Refill: 5  -     Thyroid Profile II; Future    2. Major depressive disorder, recurrent, mild (HCC)  -     ARIPiprazole (ABILIFY) 10 MG tablet; Take 1 tablet by mouth Daily.  Dispense: 30 tablet; Refill: 5  -     buPROPion XL (WELLBUTRIN XL) 150 MG 24 hr tablet; Take 1 tablet by mouth Daily.  Dispense: 30 tablet; Refill: 5  -     DULoxetine (CYMBALTA) 60 MG capsule; Take 1 capsule by mouth Daily.  Dispense: 30 capsule; Refill: 5    3. Routine lab draw  -     CBC w AUTO Differential; Future  -     Comprehensive metabolic panel; Future    4. Screening for lipid disorders  -     Lipid panel; Future    5. Need for hepatitis C screening test  -     Hepatitis C antibody; Future    6. Colon cancer screening declined  Assessment & Plan:  Patient states that she has had extensive colon surgery in for general told her that she did not need any colonoscopies due to the extent of her surgeries.                Follow Up     Return in about 6 months (around 8/1/2022).    Patient was given instructions and counseling regarding her condition or for health maintenance advice. Please see specific information pulled into the AVS if appropriate.     Theresa Chacon, APRN  02/01/2022

## 2022-02-11 ENCOUNTER — LAB (OUTPATIENT)
Dept: LAB | Facility: HOSPITAL | Age: 57
End: 2022-02-11

## 2022-02-11 DIAGNOSIS — Z01.89 ROUTINE LAB DRAW: ICD-10-CM

## 2022-02-11 DIAGNOSIS — E03.9 HYPOTHYROIDISM, UNSPECIFIED TYPE: ICD-10-CM

## 2022-02-11 DIAGNOSIS — Z13.220 SCREENING FOR LIPID DISORDERS: ICD-10-CM

## 2022-02-11 DIAGNOSIS — Z11.59 NEED FOR HEPATITIS C SCREENING TEST: ICD-10-CM

## 2022-02-11 LAB
ALBUMIN SERPL-MCNC: 4 G/DL (ref 3.5–5.2)
ALBUMIN/GLOB SERPL: 1.3 G/DL
ALP SERPL-CCNC: 122 U/L (ref 39–117)
ALT SERPL W P-5'-P-CCNC: 30 U/L (ref 1–33)
ANION GAP SERPL CALCULATED.3IONS-SCNC: 10.5 MMOL/L (ref 5–15)
AST SERPL-CCNC: 20 U/L (ref 1–32)
BASOPHILS # BLD AUTO: 0.03 10*3/MM3 (ref 0–0.2)
BASOPHILS NFR BLD AUTO: 0.5 % (ref 0–1.5)
BILIRUB SERPL-MCNC: 0.4 MG/DL (ref 0–1.2)
BUN SERPL-MCNC: 11 MG/DL (ref 6–20)
BUN/CREAT SERPL: 11.2 (ref 7–25)
CALCIUM SPEC-SCNC: 9.4 MG/DL (ref 8.6–10.5)
CHLORIDE SERPL-SCNC: 104 MMOL/L (ref 98–107)
CHOLEST SERPL-MCNC: 234 MG/DL (ref 0–200)
CO2 SERPL-SCNC: 22.5 MMOL/L (ref 22–29)
CREAT SERPL-MCNC: 0.98 MG/DL (ref 0.57–1)
DEPRECATED RDW RBC AUTO: 41.3 FL (ref 37–54)
EOSINOPHIL # BLD AUTO: 0.1 10*3/MM3 (ref 0–0.4)
EOSINOPHIL NFR BLD AUTO: 1.8 % (ref 0.3–6.2)
ERYTHROCYTE [DISTWIDTH] IN BLOOD BY AUTOMATED COUNT: 12 % (ref 12.3–15.4)
GFR SERPL CREATININE-BSD FRML MDRD: 59 ML/MIN/1.73
GLOBULIN UR ELPH-MCNC: 3.2 GM/DL
GLUCOSE SERPL-MCNC: 128 MG/DL (ref 65–99)
HCT VFR BLD AUTO: 44.9 % (ref 34–46.6)
HCV AB SER DONR QL: NORMAL
HDLC SERPL-MCNC: 62 MG/DL (ref 40–60)
HGB BLD-MCNC: 15.2 G/DL (ref 12–15.9)
IMM GRANULOCYTES # BLD AUTO: 0.01 10*3/MM3 (ref 0–0.05)
IMM GRANULOCYTES NFR BLD AUTO: 0.2 % (ref 0–0.5)
LDLC SERPL CALC-MCNC: 152 MG/DL (ref 0–100)
LDLC/HDLC SERPL: 2.4 {RATIO}
LYMPHOCYTES # BLD AUTO: 1.62 10*3/MM3 (ref 0.7–3.1)
LYMPHOCYTES NFR BLD AUTO: 29.5 % (ref 19.6–45.3)
MCH RBC QN AUTO: 32.1 PG (ref 26.6–33)
MCHC RBC AUTO-ENTMCNC: 33.9 G/DL (ref 31.5–35.7)
MCV RBC AUTO: 94.9 FL (ref 79–97)
MONOCYTES # BLD AUTO: 0.4 10*3/MM3 (ref 0.1–0.9)
MONOCYTES NFR BLD AUTO: 7.3 % (ref 5–12)
NEUTROPHILS NFR BLD AUTO: 3.33 10*3/MM3 (ref 1.7–7)
NEUTROPHILS NFR BLD AUTO: 60.7 % (ref 42.7–76)
NRBC BLD AUTO-RTO: 0 /100 WBC (ref 0–0.2)
PLATELET # BLD AUTO: 313 10*3/MM3 (ref 140–450)
PMV BLD AUTO: 10.8 FL (ref 6–12)
POTASSIUM SERPL-SCNC: 4.2 MMOL/L (ref 3.5–5.2)
PROT SERPL-MCNC: 7.2 G/DL (ref 6–8.5)
RBC # BLD AUTO: 4.73 10*6/MM3 (ref 3.77–5.28)
SODIUM SERPL-SCNC: 137 MMOL/L (ref 136–145)
TRIGL SERPL-MCNC: 116 MG/DL (ref 0–150)
VLDLC SERPL-MCNC: 20 MG/DL (ref 5–40)
WBC NRBC COR # BLD: 5.49 10*3/MM3 (ref 3.4–10.8)

## 2022-02-11 PROCEDURE — 80050 GENERAL HEALTH PANEL: CPT

## 2022-02-11 PROCEDURE — 84480 ASSAY TRIIODOTHYRONINE (T3): CPT

## 2022-02-11 PROCEDURE — 86803 HEPATITIS C AB TEST: CPT

## 2022-02-11 PROCEDURE — 84479 ASSAY OF THYROID (T3 OR T4): CPT

## 2022-02-11 PROCEDURE — 80061 LIPID PANEL: CPT

## 2022-02-11 PROCEDURE — 84436 ASSAY OF TOTAL THYROXINE: CPT

## 2022-02-11 PROCEDURE — 36415 COLL VENOUS BLD VENIPUNCTURE: CPT

## 2022-02-12 LAB
FT4I SERPL CALC-MCNC: 2.1 (ref 1.2–4.9)
T3 SERPL-MCNC: 101 NG/DL (ref 71–180)
T3RU NFR SERPL: 29 % (ref 24–39)
T4 SERPL-MCNC: 7.2 UG/DL (ref 4.5–12)
TSH SERPL DL<=0.005 MIU/L-ACNC: 3.42 UIU/ML (ref 0.45–4.5)

## 2022-02-17 ENCOUNTER — TELEPHONE (OUTPATIENT)
Dept: FAMILY MEDICINE CLINIC | Facility: CLINIC | Age: 57
End: 2022-02-17

## 2022-02-17 DIAGNOSIS — R73.09 ELEVATED GLUCOSE: Primary | ICD-10-CM

## 2022-02-17 NOTE — TELEPHONE ENCOUNTER
----- Message from Huong Nelson MA sent at 2/17/2022 11:45 AM EST -----    ----- Message -----  From: Kristen Shaw APRN  Sent: 2/15/2022   3:49 PM EST  To: Huong Nelson MA, Steffanie Blood    Glucose elevated, add hemoglobin A1c.  Cholesterol worse, is patient willing to begin cholesterol medication?  Otherwise normal labs

## 2022-05-31 ENCOUNTER — TELEPHONE (OUTPATIENT)
Dept: FAMILY MEDICINE CLINIC | Facility: CLINIC | Age: 57
End: 2022-05-31

## 2022-05-31 DIAGNOSIS — R73.09 ELEVATED GLUCOSE: Primary | ICD-10-CM

## 2022-05-31 NOTE — TELEPHONE ENCOUNTER
It looks like this originally ordered in February but was not done. I have placed a new order for an A1C. Did you want patient to have any additional labs?

## 2022-05-31 NOTE — TELEPHONE ENCOUNTER
Caller: Cher Quezada    Relationship: Self    Best call back number: [phone]     What orders are you requesting (i.e. lab or imaging):LABS     In what timeframe would the patient need to come in:ASAP     Where will you receive your lab/imaging services: OFFICE      Additional notes: PATIENT WOULD LIKE TO RECEIVE A 1 C LABS PLEASE CALL AND ADVISE PATIENT WHEN LABS ARE PUT IN          Vascular Surgery Consult    Consulting attending: Dr. Albrecht    Vascular surgery consulted as patient recently underwent RLE Angio with balloon angioplasty of SFA, popliteal, and peroneal arteries and popliteal stents x 3. HPI reviewed as below. Found in ED with Hgb of 5.7, hemodynamically stable and 2 U prBC ordered per ED. Patient seen and examined at bedside. Right foot warm with tenderness at toes 2-5. Patient explains that pain has been the same since his revascularization. Reperfusion pain was explained to patient, and patient confirms having been told this. Daughter who is at bedside concerned for infection. She does note that patient's leg has improved greatly in appearance compared to before the procedure. Denies headache, nausea, fever, chest pain, or difficulty breathing. Primary team and daughter inquires about AC / Antiplatelets considering recent stents and GIB for which he is being admitted.       HPI:  89 y/o male with PMHx of HTN, HLD, CAD, HFpEF, s/p Right CEA for Carotid artery disease, ESRD on HD 2d/week via LUE fistula, s/p flecainide w/ ILR placed 6/2020, AS s/p TAVR, PAD with RLE revasc on 10/2020, on Eliquis and Plavix, anemia requiring prior transfusions, presents to ED with daughter (who works here as a nurse), due to anemia.  Patient has had a few days of dark stools. He also endorses RLE pain that has been present for some time.  Daughter says it has improved significantly since revascularization.  Otherwise denies pain, bleeding, SOB, chest pain, abdominal pain or fevers.  Denies other pertinent medical problems.  Denies any overt substance use. (12 Nov 2020 17:42)        PAST MEDICAL HISTORY:  GI bleeding    Aortic stenosis    ESRD on dialysis    Risk factors for obstructive sleep apnea    Anemia    RICHARDS (dyspnea on exertion)    VT (ventricular tachycardia)    HTN (hypertension)    CAD (coronary artery disease)    CKD (chronic kidney disease)    Arrhythmia    AV block, 1st degree    DM (diabetes mellitus)          PAST SURGICAL HISTORY:  S/P TAVR (transcatheter aortic valve replacement)    H/O carotid endarterectomy    A-V fistula    H/O angioplasty    H/O left knee surgery    H/O circumcision          MEDICATIONS:  acetaminophen   Tablet .. 650 milliGRAM(s) Oral every 6 hours  atorvastatin 80 milliGRAM(s) Oral at bedtime  cloNIDine 0.1 milliGRAM(s) Oral at bedtime  doxazosin 1 milliGRAM(s) Oral at bedtime  DULoxetine 60 milliGRAM(s) Oral daily  furosemide   Injectable 40 milliGRAM(s) IV Push once  hydrALAZINE  Oral Tab/Cap - Peds 50 milliGRAM(s) Oral two times a day before meals  isosorbide   mononitrate ER Tablet (IMDUR) 30 milliGRAM(s) Oral daily  multivitamin 1 Tablet(s) Oral daily  NIFEdipine XL 60 milliGRAM(s) Oral at bedtime  NIFEdipine XL 90 milliGRAM(s) Oral daily  pantoprazole  Injectable 40 milliGRAM(s) IV Push daily  sevelamer carbonate 800 milliGRAM(s) Oral three times a day with meals  traMADol 25 milliGRAM(s) Oral every 6 hours PRN        ALLERGIES:  Plavix (Hives)  Toprol-XL (Rash)        VITALS & I/Os:  Vital Signs Last 24 Hrs  T(C): 36.7 (12 Nov 2020 18:24), Max: 36.7 (12 Nov 2020 18:24)  T(F): 98.1 (12 Nov 2020 18:24), Max: 98.1 (12 Nov 2020 18:24)  HR: 85 (12 Nov 2020 18:24) (78 - 85)  BP: 134/60 (12 Nov 2020 18:24) (134/60 - 147/52)  BP(mean): --  RR: 18 (12 Nov 2020 18:24) (18 - 18)  SpO2: 98% (12 Nov 2020 18:24) (98% - 99%)    I&O's Summary        PHYSICAL EXAM:  General: No acute distress, though uncomfortable due to RLE pain.   Respiratory: Nonlabored, equal chest rise  Cardiovascular: Regular rate  Abdominal: Soft, nondistended, nontender. No rebound or guarding.   Extremities: Warm and well perfused. Digits with mild redness and severe tenderness of toes 2-5. Overlying skin debris No bleeding, ischemic tissue, or purulence appreciated. DP with triphasic doppler signals.         LABS:                        5.7    9.45  )-----------( 243      ( 12 Nov 2020 16:19 )             19.6     11-12    136  |  98  |  112.0<H>  ----------------------------<  154<H>  5.1   |  22.0  |  5.16<H>    Ca    8.8      12 Nov 2020 16:19    TPro  5.4<L>  /  Alb  3.1<L>  /  TBili  0.2<L>  /  DBili  x   /  AST  18  /  ALT  10  /  AlkPhos  87  11-12    Lactate:    PT/INR - ( 12 Nov 2020 16:19 )   PT: 15.2 sec;   INR: 1.33 ratio         PTT - ( 12 Nov 2020 16:19 )  PTT:34.4 sec    CARDIAC MARKERS ( 12 Nov 2020 16:19 )  x     / 0.13 ng/mL / x     / x     / x              IMAGING:  No new pertinent imaging,.

## 2022-06-02 ENCOUNTER — LAB (OUTPATIENT)
Dept: LAB | Facility: HOSPITAL | Age: 57
End: 2022-06-02

## 2022-06-02 DIAGNOSIS — R73.09 ELEVATED GLUCOSE: ICD-10-CM

## 2022-06-02 LAB — HBA1C MFR BLD: 6 % (ref 4.8–5.6)

## 2022-06-02 PROCEDURE — 83036 HEMOGLOBIN GLYCOSYLATED A1C: CPT

## 2022-06-02 PROCEDURE — 36415 COLL VENOUS BLD VENIPUNCTURE: CPT

## 2022-06-06 ENCOUNTER — TELEPHONE (OUTPATIENT)
Dept: FAMILY MEDICINE CLINIC | Facility: CLINIC | Age: 57
End: 2022-06-06

## 2022-06-06 DIAGNOSIS — R73.9 HIGH BLOOD SUGAR: Primary | ICD-10-CM

## 2022-06-06 NOTE — TELEPHONE ENCOUNTER
----- Message from DESMOND Botello sent at 6/6/2022  7:31 AM EDT -----  A1c consistent with diagnosis of prediabetes, patient needs to work on diet changes, decreasing carbs and sugars, and weight loss.  We will recheck A1c in 6 months

## 2022-08-02 ENCOUNTER — OFFICE VISIT (OUTPATIENT)
Dept: FAMILY MEDICINE CLINIC | Facility: CLINIC | Age: 57
End: 2022-08-02

## 2022-08-02 VITALS
OXYGEN SATURATION: 98 % | SYSTOLIC BLOOD PRESSURE: 128 MMHG | HEIGHT: 63 IN | DIASTOLIC BLOOD PRESSURE: 78 MMHG | BODY MASS INDEX: 51.91 KG/M2 | WEIGHT: 293 LBS | HEART RATE: 92 BPM

## 2022-08-02 DIAGNOSIS — Z12.31 BREAST CANCER SCREENING BY MAMMOGRAM: ICD-10-CM

## 2022-08-02 DIAGNOSIS — E55.9 VITAMIN D DEFICIENCY: ICD-10-CM

## 2022-08-02 DIAGNOSIS — F33.0 MAJOR DEPRESSIVE DISORDER, RECURRENT, MILD: ICD-10-CM

## 2022-08-02 DIAGNOSIS — Z13.220 SCREENING FOR LIPID DISORDERS: ICD-10-CM

## 2022-08-02 DIAGNOSIS — E03.9 HYPOTHYROIDISM, UNSPECIFIED TYPE: Primary | ICD-10-CM

## 2022-08-02 DIAGNOSIS — Z01.89 ROUTINE LAB DRAW: ICD-10-CM

## 2022-08-02 PROCEDURE — 99214 OFFICE O/P EST MOD 30 MIN: CPT | Performed by: NURSE PRACTITIONER

## 2022-08-02 RX ORDER — ARIPIPRAZOLE 10 MG/1
10 TABLET ORAL DAILY
Qty: 30 TABLET | Refills: 5 | Status: SHIPPED | OUTPATIENT
Start: 2022-08-02 | End: 2023-02-06 | Stop reason: SDUPTHER

## 2022-08-02 RX ORDER — DULOXETIN HYDROCHLORIDE 60 MG/1
60 CAPSULE, DELAYED RELEASE ORAL DAILY
Qty: 30 CAPSULE | Refills: 5 | Status: SHIPPED | OUTPATIENT
Start: 2022-08-02 | End: 2023-02-06 | Stop reason: SDUPTHER

## 2022-08-02 RX ORDER — LEVOTHYROXINE SODIUM 0.07 MG/1
75 TABLET ORAL DAILY
Qty: 30 TABLET | Refills: 5 | Status: SHIPPED | OUTPATIENT
Start: 2022-08-02 | End: 2023-02-06 | Stop reason: SDUPTHER

## 2022-08-02 RX ORDER — BUPROPION HYDROCHLORIDE 150 MG/1
150 TABLET ORAL DAILY
Qty: 30 TABLET | Refills: 5 | Status: SHIPPED | OUTPATIENT
Start: 2022-08-02 | End: 2023-02-06 | Stop reason: SDUPTHER

## 2022-08-02 NOTE — PROGRESS NOTES
"Chief Complaint  Depression (Abilify, Wellbutrin, Cymbalta) and Hypothyroidism (Levothyroxine 75mcg)    SUBJECTIVE  Cher Quezada presents to [facility]     Patient presents today to follow-up on chronic conditions including depression (Abilify, Wellbutrin, Cymbalta) and Hypothyroidism (Levothyroxine 75mcg).  States she is doing well overall, no complaints    History of Present Illness  Past Medical History:   Diagnosis Date   • Arthritis    • Bipolar disorder (HCC)    • Chronic allergic rhinitis    • Constipation    • Hypothyroidism    • Migraine    • Mood disorder (HCC)    • Obesity    • Obstructive sleep apnea       Family History   Problem Relation Age of Onset   • Diabetes Mother    • Breast cancer Mother 60   • Diabetes Father       Past Surgical History:   Procedure Laterality Date   • ANAL FISSURECTOMY     • COLON SURGERY      ABDOMINAL   • GASTRIC BYPASS  2006   • HERNIA REPAIR     • ILEOSTOMY REVISION      REVERSAL   • RECTAL SURGERY  1994    FISTULA   • TUBAL ABDOMINAL LIGATION          Current Outpatient Medications:   •  ARIPiprazole (ABILIFY) 10 MG tablet, Take 1 tablet by mouth Daily., Disp: 30 tablet, Rfl: 5  •  buPROPion XL (WELLBUTRIN XL) 150 MG 24 hr tablet, Take 1 tablet by mouth Daily., Disp: 30 tablet, Rfl: 5  •  Cholecalciferol 25 MCG (1000 UT) capsule, Take 1,000 Units by mouth Daily., Disp: , Rfl:   •  Cyanocobalamin ER 2000 MCG tablet controlled-release, Take 2,000 mcg by mouth Daily., Disp: , Rfl:   •  DULoxetine (CYMBALTA) 60 MG capsule, Take 1 capsule by mouth Daily., Disp: 30 capsule, Rfl: 5  •  levothyroxine (Synthroid) 75 MCG tablet, Take 1 tablet by mouth Daily., Disp: 30 tablet, Rfl: 5  •  loratadine (CLARITIN) 10 MG tablet, Take 1 tablet by mouth Daily., Disp: , Rfl:   •  Zinc Sulfate 220 (50 Zn) MG tablet, Take 50 mg by mouth Daily., Disp: , Rfl:     OBJECTIVE  Vital Signs:   /78   Pulse 92   Ht 160 cm (63\")   Wt (!) 142 kg (314 " "lb)   SpO2 98%   BMI 55.62 kg/m²    Estimated body mass index is 55.62 kg/m² as calculated from the following:    Height as of this encounter: 160 cm (63\").    Weight as of this encounter: 142 kg (314 lb).     Wt Readings from Last 3 Encounters:   08/02/22 (!) 142 kg (314 lb)   02/01/22 (!) 142 kg (313 lb)   08/18/21 (!) 142 kg (313 lb 12.8 oz)     BP Readings from Last 3 Encounters:   08/02/22 128/78   02/01/22 128/74   08/18/21 132/75       Physical Exam  Vitals reviewed.   Constitutional:       Appearance: Normal appearance. She is well-developed.   HENT:      Head: Normocephalic and atraumatic.      Right Ear: External ear normal.      Left Ear: External ear normal.   Eyes:      Conjunctiva/sclera: Conjunctivae normal.      Pupils: Pupils are equal, round, and reactive to light.   Cardiovascular:      Rate and Rhythm: Normal rate and regular rhythm.      Heart sounds: No murmur heard.    No friction rub. No gallop.   Pulmonary:      Effort: Pulmonary effort is normal.      Breath sounds: Normal breath sounds. No wheezing or rhonchi.   Skin:     General: Skin is warm and dry.   Neurological:      Mental Status: She is alert and oriented to person, place, and time.      Cranial Nerves: No cranial nerve deficit.   Psychiatric:         Mood and Affect: Mood and affect normal.         Behavior: Behavior normal.         Thought Content: Thought content normal.         Judgment: Judgment normal.          Result Review     CMP    CMP 2/11/22   Glucose 128 (A)   BUN 11   Creatinine 0.98   eGFR Non African Am 59 (A)   Sodium 137   Potassium 4.2   Chloride 104   Calcium 9.4   Albumin 4.00   Total Bilirubin 0.4   Alkaline Phosphatase 122 (A)   AST (SGOT) 20   ALT (SGPT) 30   (A) Abnormal value            CBC    CBC 2/11/22   WBC 5.49   RBC 4.73   Hemoglobin 15.2   Hematocrit 44.9   MCV 94.9   MCH 32.1   MCHC 33.9   RDW 12.0 (A)   Platelets 313   (A) Abnormal value            Lipid Panel    Lipid Panel 2/11/22   Total " Cholesterol 234 (A)   Triglycerides 116   HDL Cholesterol 62 (A)   VLDL Cholesterol 20   LDL Cholesterol  152 (A)   LDL/HDL Ratio 2.40   (A) Abnormal value            TSH    TSH 8/18/21 9/30/21 2/11/22   TSH 4.390 (A) 2.850 3.420   (A) Abnormal value            Most Recent A1C    HGBA1C Most Recent 6/2/22   Hemoglobin A1C 6.00 (A)   (A) Abnormal value              No Images in the past 120 days found..     The above data has been reviewed by DESMOND Haro 08/02/2022 11:02 EDT.          Patient Care Team:  Theresa Chacon APRN as PCP - General (Nurse Practitioner)           ASSESSMENT & PLAN    Diagnoses and all orders for this visit:    1. Hypothyroidism, unspecified type (Primary)  Assessment & Plan:  Controlled on levothyroxine, continue current dose    Orders:  -     TSH+Free T4; Future  -     levothyroxine (Synthroid) 75 MCG tablet; Take 1 tablet by mouth Daily.  Dispense: 30 tablet; Refill: 5    2. Screening for lipid disorders  -     Lipid panel; Future    3. Vitamin D deficiency  -     Vitamin D 25 hydroxy; Future    4. Routine lab draw  -     CBC w AUTO Differential; Future  -     Comprehensive metabolic panel; Future  -     Lipid panel; Future  -     TSH+Free T4; Future  -     Vitamin D 25 hydroxy; Future    5. Major depressive disorder, recurrent, mild (HCC)  Assessment & Plan:  Patient's symptoms are well controlled with medication, continue current dose    Orders:  -     ARIPiprazole (ABILIFY) 10 MG tablet; Take 1 tablet by mouth Daily.  Dispense: 30 tablet; Refill: 5  -     buPROPion XL (WELLBUTRIN XL) 150 MG 24 hr tablet; Take 1 tablet by mouth Daily.  Dispense: 30 tablet; Refill: 5  -     DULoxetine (CYMBALTA) 60 MG capsule; Take 1 capsule by mouth Daily.  Dispense: 30 capsule; Refill: 5    6. Breast cancer screening by mammogram  -     Mammo Screening Digital Tomosynthesis Bilateral With CAD; Future       Tobacco Use: Low Risk    • Smoking Tobacco Use: Never Smoker   • Smokeless Tobacco  Use: Never Used       Follow Up       Recommend attempted weight loss, healthy diet and exercise      Return in about 6 months (around 2/2/2023).        Patient was given instructions and counseling regarding her condition or for health maintenance advice. Please see specific information pulled into the AVS if appropriate.   I have reviewed information obtained and documented by others and I have confirmed the accuracy of this documented note.    Theresa Chacon, APRN

## 2022-08-24 ENCOUNTER — LAB (OUTPATIENT)
Dept: LAB | Facility: HOSPITAL | Age: 57
End: 2022-08-24

## 2022-08-24 DIAGNOSIS — E55.9 VITAMIN D DEFICIENCY: ICD-10-CM

## 2022-08-24 DIAGNOSIS — E03.9 HYPOTHYROIDISM, UNSPECIFIED TYPE: ICD-10-CM

## 2022-08-24 DIAGNOSIS — R73.09 ELEVATED GLUCOSE: ICD-10-CM

## 2022-08-24 DIAGNOSIS — Z13.220 SCREENING FOR LIPID DISORDERS: ICD-10-CM

## 2022-08-24 DIAGNOSIS — Z01.89 ROUTINE LAB DRAW: ICD-10-CM

## 2022-08-24 LAB
25(OH)D3 SERPL-MCNC: 42.6 NG/ML (ref 30–100)
ALBUMIN SERPL-MCNC: 3.9 G/DL (ref 3.5–5.2)
ALBUMIN/GLOB SERPL: 1.1 G/DL
ALP SERPL-CCNC: 124 U/L (ref 39–117)
ALT SERPL W P-5'-P-CCNC: 36 U/L (ref 1–33)
ANION GAP SERPL CALCULATED.3IONS-SCNC: 13.5 MMOL/L (ref 5–15)
AST SERPL-CCNC: 26 U/L (ref 1–32)
BASOPHILS # BLD AUTO: 0.03 10*3/MM3 (ref 0–0.2)
BASOPHILS NFR BLD AUTO: 0.5 % (ref 0–1.5)
BILIRUB SERPL-MCNC: 0.4 MG/DL (ref 0–1.2)
BUN SERPL-MCNC: 10 MG/DL (ref 6–20)
BUN/CREAT SERPL: 9.7 (ref 7–25)
CALCIUM SPEC-SCNC: 9.4 MG/DL (ref 8.6–10.5)
CHLORIDE SERPL-SCNC: 105 MMOL/L (ref 98–107)
CHOLEST SERPL-MCNC: 234 MG/DL (ref 0–200)
CO2 SERPL-SCNC: 20.5 MMOL/L (ref 22–29)
CREAT SERPL-MCNC: 1.03 MG/DL (ref 0.57–1)
DEPRECATED RDW RBC AUTO: 38.7 FL (ref 37–54)
EGFRCR SERPLBLD CKD-EPI 2021: 64.3 ML/MIN/1.73
EOSINOPHIL # BLD AUTO: 0.08 10*3/MM3 (ref 0–0.4)
EOSINOPHIL NFR BLD AUTO: 1.4 % (ref 0.3–6.2)
ERYTHROCYTE [DISTWIDTH] IN BLOOD BY AUTOMATED COUNT: 11.5 % (ref 12.3–15.4)
GLOBULIN UR ELPH-MCNC: 3.5 GM/DL
GLUCOSE SERPL-MCNC: 143 MG/DL (ref 65–99)
HCT VFR BLD AUTO: 44 % (ref 34–46.6)
HDLC SERPL-MCNC: 65 MG/DL (ref 40–60)
HGB BLD-MCNC: 15.2 G/DL (ref 12–15.9)
IMM GRANULOCYTES # BLD AUTO: 0.01 10*3/MM3 (ref 0–0.05)
IMM GRANULOCYTES NFR BLD AUTO: 0.2 % (ref 0–0.5)
LDLC SERPL CALC-MCNC: 149 MG/DL (ref 0–100)
LDLC/HDLC SERPL: 2.25 {RATIO}
LYMPHOCYTES # BLD AUTO: 1.58 10*3/MM3 (ref 0.7–3.1)
LYMPHOCYTES NFR BLD AUTO: 27 % (ref 19.6–45.3)
MCH RBC QN AUTO: 32.3 PG (ref 26.6–33)
MCHC RBC AUTO-ENTMCNC: 34.5 G/DL (ref 31.5–35.7)
MCV RBC AUTO: 93.6 FL (ref 79–97)
MONOCYTES # BLD AUTO: 0.46 10*3/MM3 (ref 0.1–0.9)
MONOCYTES NFR BLD AUTO: 7.9 % (ref 5–12)
NEUTROPHILS NFR BLD AUTO: 3.69 10*3/MM3 (ref 1.7–7)
NEUTROPHILS NFR BLD AUTO: 63 % (ref 42.7–76)
NRBC BLD AUTO-RTO: 0 /100 WBC (ref 0–0.2)
PLATELET # BLD AUTO: 347 10*3/MM3 (ref 140–450)
PMV BLD AUTO: 10.4 FL (ref 6–12)
POTASSIUM SERPL-SCNC: 4.4 MMOL/L (ref 3.5–5.2)
PROT SERPL-MCNC: 7.4 G/DL (ref 6–8.5)
RBC # BLD AUTO: 4.7 10*6/MM3 (ref 3.77–5.28)
SODIUM SERPL-SCNC: 139 MMOL/L (ref 136–145)
T4 FREE SERPL-MCNC: 1.1 NG/DL (ref 0.93–1.7)
TRIGL SERPL-MCNC: 113 MG/DL (ref 0–150)
TSH SERPL DL<=0.05 MIU/L-ACNC: 3.9 UIU/ML (ref 0.27–4.2)
VLDLC SERPL-MCNC: 20 MG/DL (ref 5–40)
WBC NRBC COR # BLD: 5.85 10*3/MM3 (ref 3.4–10.8)

## 2022-08-24 PROCEDURE — 84439 ASSAY OF FREE THYROXINE: CPT

## 2022-08-24 PROCEDURE — 83036 HEMOGLOBIN GLYCOSYLATED A1C: CPT

## 2022-08-24 PROCEDURE — 80050 GENERAL HEALTH PANEL: CPT

## 2022-08-24 PROCEDURE — 36415 COLL VENOUS BLD VENIPUNCTURE: CPT

## 2022-08-24 PROCEDURE — 80061 LIPID PANEL: CPT

## 2022-08-24 PROCEDURE — 82306 VITAMIN D 25 HYDROXY: CPT

## 2022-08-25 DIAGNOSIS — R73.09 ELEVATED GLUCOSE: ICD-10-CM

## 2022-08-25 DIAGNOSIS — E78.5 HYPERLIPIDEMIA, UNSPECIFIED HYPERLIPIDEMIA TYPE: ICD-10-CM

## 2022-08-25 DIAGNOSIS — R74.8 ELEVATED LIVER ENZYMES: Primary | ICD-10-CM

## 2022-08-25 LAB — HBA1C MFR BLD: 6.2 % (ref 4.8–5.6)

## 2022-08-25 RX ORDER — ROSUVASTATIN CALCIUM 5 MG/1
5 TABLET, COATED ORAL DAILY
Qty: 90 TABLET | Refills: 1 | Status: SHIPPED | OUTPATIENT
Start: 2022-08-25 | End: 2023-02-06 | Stop reason: SDUPTHER

## 2022-08-29 DIAGNOSIS — R73.09 ELEVATED GLUCOSE: Primary | ICD-10-CM

## 2022-09-10 ENCOUNTER — HOSPITAL ENCOUNTER (OUTPATIENT)
Dept: MAMMOGRAPHY | Facility: HOSPITAL | Age: 57
Discharge: HOME | End: 2022-09-10
Admitting: NURSE PRACTITIONER

## 2022-09-10 DIAGNOSIS — Z12.31 BREAST CANCER SCREENING BY MAMMOGRAM: ICD-10-CM

## 2022-09-10 PROCEDURE — 77067 SCR MAMMO BI INCL CAD: CPT

## 2022-09-10 PROCEDURE — 77063 BREAST TOMOSYNTHESIS BI: CPT

## 2022-09-14 ENCOUNTER — APPOINTMENT (OUTPATIENT)
Dept: ULTRASOUND IMAGING | Facility: HOSPITAL | Age: 57
End: 2022-09-14

## 2022-09-15 DIAGNOSIS — R92.8 ABNORMAL MAMMOGRAM: ICD-10-CM

## 2022-09-15 DIAGNOSIS — N63.21 MASS OF UPPER OUTER QUADRANT OF LEFT BREAST: Primary | ICD-10-CM

## 2022-09-20 ENCOUNTER — HOSPITAL ENCOUNTER (OUTPATIENT)
Dept: ULTRASOUND IMAGING | Facility: HOSPITAL | Age: 57
Discharge: HOME | End: 2022-09-20

## 2022-09-20 ENCOUNTER — LAB (OUTPATIENT)
Dept: LAB | Facility: HOSPITAL | Age: 57
End: 2022-09-20

## 2022-09-20 DIAGNOSIS — R74.8 ELEVATED LIVER ENZYMES: ICD-10-CM

## 2022-09-20 DIAGNOSIS — N63.21 MASS OF UPPER OUTER QUADRANT OF LEFT BREAST: ICD-10-CM

## 2022-09-20 DIAGNOSIS — R92.8 ABNORMAL MAMMOGRAM: ICD-10-CM

## 2022-09-20 LAB
HAV IGM SERPL QL IA: NORMAL
HBV CORE IGM SERPL QL IA: NORMAL
HBV SURFACE AG SERPL QL IA: NORMAL
HCV AB SER DONR QL: NORMAL

## 2022-09-20 PROCEDURE — 76642 ULTRASOUND BREAST LIMITED: CPT

## 2022-09-20 PROCEDURE — 36415 COLL VENOUS BLD VENIPUNCTURE: CPT

## 2022-09-20 PROCEDURE — 80074 ACUTE HEPATITIS PANEL: CPT

## 2022-12-13 ENCOUNTER — TELEPHONE (OUTPATIENT)
Dept: FAMILY MEDICINE CLINIC | Facility: CLINIC | Age: 57
End: 2022-12-13

## 2023-01-26 ENCOUNTER — LAB (OUTPATIENT)
Dept: LAB | Facility: HOSPITAL | Age: 58
End: 2023-01-26
Payer: COMMERCIAL

## 2023-01-26 DIAGNOSIS — E78.5 HYPERLIPIDEMIA, UNSPECIFIED HYPERLIPIDEMIA TYPE: ICD-10-CM

## 2023-01-26 DIAGNOSIS — R73.9 HIGH BLOOD SUGAR: ICD-10-CM

## 2023-01-26 DIAGNOSIS — R73.09 ELEVATED GLUCOSE: ICD-10-CM

## 2023-01-26 LAB
CHOLEST SERPL-MCNC: 172 MG/DL (ref 0–200)
HBA1C MFR BLD: 6.3 % (ref 4.8–5.6)
HDLC SERPL-MCNC: 62 MG/DL (ref 40–60)
LDLC SERPL CALC-MCNC: 90 MG/DL (ref 0–100)
LDLC/HDLC SERPL: 1.41 {RATIO}
TRIGL SERPL-MCNC: 112 MG/DL (ref 0–150)
VLDLC SERPL-MCNC: 20 MG/DL (ref 5–40)

## 2023-01-26 PROCEDURE — 83036 HEMOGLOBIN GLYCOSYLATED A1C: CPT

## 2023-01-26 PROCEDURE — 80061 LIPID PANEL: CPT

## 2023-01-26 PROCEDURE — 36415 COLL VENOUS BLD VENIPUNCTURE: CPT

## 2023-02-06 ENCOUNTER — LAB (OUTPATIENT)
Dept: LAB | Facility: HOSPITAL | Age: 58
End: 2023-02-06
Payer: COMMERCIAL

## 2023-02-06 ENCOUNTER — OFFICE VISIT (OUTPATIENT)
Dept: FAMILY MEDICINE CLINIC | Facility: CLINIC | Age: 58
End: 2023-02-06
Payer: COMMERCIAL

## 2023-02-06 VITALS
OXYGEN SATURATION: 98 % | DIASTOLIC BLOOD PRESSURE: 70 MMHG | WEIGHT: 293 LBS | HEART RATE: 90 BPM | BODY MASS INDEX: 51.91 KG/M2 | HEIGHT: 63 IN | SYSTOLIC BLOOD PRESSURE: 121 MMHG

## 2023-02-06 DIAGNOSIS — E78.5 HYPERLIPIDEMIA, UNSPECIFIED HYPERLIPIDEMIA TYPE: ICD-10-CM

## 2023-02-06 DIAGNOSIS — R73.03 PREDIABETES: ICD-10-CM

## 2023-02-06 DIAGNOSIS — E03.9 HYPOTHYROIDISM, UNSPECIFIED TYPE: ICD-10-CM

## 2023-02-06 DIAGNOSIS — R74.8 ELEVATED LIVER ENZYMES: ICD-10-CM

## 2023-02-06 DIAGNOSIS — R74.8 ELEVATED LIVER ENZYMES: Primary | ICD-10-CM

## 2023-02-06 DIAGNOSIS — F33.0 MAJOR DEPRESSIVE DISORDER, RECURRENT, MILD: ICD-10-CM

## 2023-02-06 PROCEDURE — 99214 OFFICE O/P EST MOD 30 MIN: CPT | Performed by: NURSE PRACTITIONER

## 2023-02-06 PROCEDURE — 36415 COLL VENOUS BLD VENIPUNCTURE: CPT

## 2023-02-06 PROCEDURE — 84439 ASSAY OF FREE THYROXINE: CPT | Performed by: NURSE PRACTITIONER

## 2023-02-06 PROCEDURE — 80053 COMPREHEN METABOLIC PANEL: CPT

## 2023-02-06 PROCEDURE — 84443 ASSAY THYROID STIM HORMONE: CPT | Performed by: NURSE PRACTITIONER

## 2023-02-06 RX ORDER — DULOXETIN HYDROCHLORIDE 60 MG/1
60 CAPSULE, DELAYED RELEASE ORAL DAILY
Qty: 30 CAPSULE | Refills: 5 | Status: SHIPPED | OUTPATIENT
Start: 2023-02-06

## 2023-02-06 RX ORDER — LEVOTHYROXINE SODIUM 0.07 MG/1
75 TABLET ORAL DAILY
Qty: 30 TABLET | Refills: 5 | Status: SHIPPED | OUTPATIENT
Start: 2023-02-06

## 2023-02-06 RX ORDER — ROSUVASTATIN CALCIUM 5 MG/1
5 TABLET, COATED ORAL DAILY
Qty: 30 TABLET | Refills: 5 | Status: SHIPPED | OUTPATIENT
Start: 2023-02-06

## 2023-02-06 RX ORDER — BUPROPION HYDROCHLORIDE 150 MG/1
150 TABLET ORAL DAILY
Qty: 30 TABLET | Refills: 5 | Status: SHIPPED | OUTPATIENT
Start: 2023-02-06

## 2023-02-06 RX ORDER — ARIPIPRAZOLE 10 MG/1
10 TABLET ORAL DAILY
Qty: 30 TABLET | Refills: 5 | Status: SHIPPED | OUTPATIENT
Start: 2023-02-06

## 2023-02-06 NOTE — PROGRESS NOTES
Chief Complaint  Hypothyroidism    SUBJECTIVE  Cher Quezada presents to [facility] follow up for hypothroidism. Pt states that she feels pretty good and is also here for medication refills.    Following up on hypothyroidism, depression, hyperlipidemia    History of Present Illness  Past Medical History:   Diagnosis Date   • Arthritis    • Bipolar disorder (HCC)    • Chronic allergic rhinitis    • Constipation    • Hypothyroidism    • Migraine    • Mood disorder (HCC)    • Obesity    • Obstructive sleep apnea       Family History   Problem Relation Age of Onset   • Diabetes Mother    • Breast cancer Mother 60   • Diabetes Father       Past Surgical History:   Procedure Laterality Date   • ANAL FISSURECTOMY     • COLON SURGERY      ABDOMINAL   • GASTRIC BYPASS  2006   • HERNIA REPAIR     • ILEOSTOMY REVISION      REVERSAL   • RECTAL SURGERY  1994    FISTULA   • TUBAL ABDOMINAL LIGATION          Current Outpatient Medications:   •  ARIPiprazole (ABILIFY) 10 MG tablet, Take 1 tablet by mouth Daily., Disp: 30 tablet, Rfl: 5  •  buPROPion XL (WELLBUTRIN XL) 150 MG 24 hr tablet, Take 1 tablet by mouth Daily., Disp: 30 tablet, Rfl: 5  •  Cholecalciferol 25 MCG (1000 UT) capsule, Take 1,000 Units by mouth Daily., Disp: , Rfl:   •  Cyanocobalamin ER 2000 MCG tablet controlled-release, Take 2,000 mcg by mouth Daily., Disp: , Rfl:   •  DULoxetine (CYMBALTA) 60 MG capsule, Take 1 capsule by mouth Daily., Disp: 30 capsule, Rfl: 5  •  levothyroxine (Synthroid) 75 MCG tablet, Take 1 tablet by mouth Daily., Disp: 30 tablet, Rfl: 5  •  loratadine (CLARITIN) 10 MG tablet, Take 1 tablet by mouth Daily., Disp: , Rfl:   •  rosuvastatin (Crestor) 5 MG tablet, Take 1 tablet by mouth Daily., Disp: 30 tablet, Rfl: 5  •  Zinc Sulfate 220 (50 Zn) MG tablet, Take 50 mg by mouth Daily., Disp: , Rfl:     OBJECTIVE  Vital Signs:   /70 (BP Location: Right arm, Patient Position: Sitting, Cuff Size:  "Large Adult)   Pulse 90   Ht 160 cm (63\")   Wt (!) 142 kg (313 lb)   SpO2 98%   BMI 55.45 kg/m²    Estimated body mass index is 55.45 kg/m² as calculated from the following:    Height as of this encounter: 160 cm (63\").    Weight as of this encounter: 142 kg (313 lb).     Wt Readings from Last 3 Encounters:   02/06/23 (!) 142 kg (313 lb)   08/02/22 (!) 142 kg (314 lb)   02/01/22 (!) 142 kg (313 lb)     BP Readings from Last 3 Encounters:   02/06/23 121/70   08/02/22 128/78   02/01/22 128/74       Physical Exam  Vitals reviewed.   Constitutional:       Appearance: Normal appearance. She is well-developed.   HENT:      Head: Normocephalic and atraumatic.      Right Ear: External ear normal.      Left Ear: External ear normal.   Eyes:      Conjunctiva/sclera: Conjunctivae normal.      Pupils: Pupils are equal, round, and reactive to light.   Cardiovascular:      Rate and Rhythm: Normal rate and regular rhythm.      Heart sounds: No murmur heard.    No friction rub. No gallop.   Pulmonary:      Effort: Pulmonary effort is normal.      Breath sounds: Normal breath sounds. No wheezing or rhonchi.   Skin:     General: Skin is warm and dry.   Neurological:      Mental Status: She is alert and oriented to person, place, and time.      Cranial Nerves: No cranial nerve deficit.   Psychiatric:         Mood and Affect: Mood and affect normal.         Behavior: Behavior normal.         Thought Content: Thought content normal.         Judgment: Judgment normal.          Result Review    CMP    CMP 8/24/22   Glucose 143 (A)   BUN 10   Creatinine 1.03 (A)   eGFR 64.3   Sodium 139   Potassium 4.4   Chloride 105   Calcium 9.4   Total Protein 7.4   Albumin 3.90   Globulin 3.5   Total Bilirubin 0.4   Alkaline Phosphatase 124 (A)   AST (SGOT) 26   ALT (SGPT) 36 (A)   Albumin/Globulin Ratio 1.1   BUN/Creatinine Ratio 9.7   Anion Gap 13.5   (A) Abnormal value       Comments are available for some flowsheets but are not being " displayed.           CBC    CBC 8/24/22   WBC 5.85   RBC 4.70   Hemoglobin 15.2   Hematocrit 44.0   MCV 93.6   MCH 32.3   MCHC 34.5   RDW 11.5 (A)   Platelets 347   (A) Abnormal value            Lipid Panel    Lipid Panel 8/24/22 1/26/23   Total Cholesterol 234 (A) 172   Triglycerides 113 112   HDL Cholesterol 65 (A) 62 (A)   VLDL Cholesterol 20 20   LDL Cholesterol  149 (A) 90   LDL/HDL Ratio 2.25 1.41   (A) Abnormal value            TSH    TSH 8/24/22   TSH 3.900           Most Recent A1C    HGBA1C Most Recent 1/26/23   Hemoglobin A1C 6.30 (A)   (A) Abnormal value              No Images in the past 120 days found..     The above data has been reviewed by DESMOND Haro 02/06/2023 11:59 EST.          Patient Care Team:  Theresa Chacon APRN as PCP - General (Nurse Practitioner)    Class 3 Severe Obesity (BMI >=40). . Obesity is unchanged. BMI is is above average; BMI management plan is completed. We discussed portion control and increasing exercise.       ASSESSMENT & PLAN    Diagnoses and all orders for this visit:    1. Elevated liver enzymes (Primary)  Comments:  LFTs elevated on last check, patient reports she believes she has a history of fatty liver, recheck labs today  Orders:  -     Comprehensive metabolic panel; Future    2. Hypothyroidism, unspecified type  Assessment & Plan:  Stable on levothyroxine, continue current dose, recheck labs today    Orders:  -     levothyroxine (Synthroid) 75 MCG tablet; Take 1 tablet by mouth Daily.  Dispense: 30 tablet; Refill: 5  -     TSH+Free T4    3. Hyperlipidemia, unspecified hyperlipidemia type  Assessment & Plan:  Well-controlled low-dose Crestor, continue current dose    Orders:  -     rosuvastatin (Crestor) 5 MG tablet; Take 1 tablet by mouth Daily.  Dispense: 30 tablet; Refill: 5    4. Major depressive disorder, recurrent, mild (HCC)  Assessment & Plan:  Stable and well-controlled with Abilify, Wellbutrin, continue current medications    Orders:  -      DULoxetine (CYMBALTA) 60 MG capsule; Take 1 capsule by mouth Daily.  Dispense: 30 capsule; Refill: 5  -     buPROPion XL (WELLBUTRIN XL) 150 MG 24 hr tablet; Take 1 tablet by mouth Daily.  Dispense: 30 tablet; Refill: 5  -     ARIPiprazole (ABILIFY) 10 MG tablet; Take 1 tablet by mouth Daily.  Dispense: 30 tablet; Refill: 5    5. Prediabetes  Assessment & Plan:  Diet and exercise changes discussed, patient declines medication at this time         Tobacco Use: Low Risk    • Smoking Tobacco Use: Never   • Smokeless Tobacco Use: Never   • Passive Exposure: Not on file       Follow Up     Return in about 6 months (around 8/6/2023), or if symptoms worsen or fail to improve.        Patient was given instructions and counseling regarding her condition or for health maintenance advice. Please see specific information pulled into the AVS if appropriate.   I have reviewed information obtained and documented by others and I have confirmed the accuracy of this documented note.    DESMOND Haro

## 2023-02-07 LAB
ALBUMIN SERPL-MCNC: 4 G/DL (ref 3.5–5.2)
ALBUMIN/GLOB SERPL: 1.4 G/DL
ALP SERPL-CCNC: 138 U/L (ref 39–117)
ALT SERPL W P-5'-P-CCNC: 25 U/L (ref 1–33)
ANION GAP SERPL CALCULATED.3IONS-SCNC: 7 MMOL/L (ref 5–15)
AST SERPL-CCNC: 18 U/L (ref 1–32)
BILIRUB SERPL-MCNC: 0.3 MG/DL (ref 0–1.2)
BUN SERPL-MCNC: 10 MG/DL (ref 6–20)
BUN/CREAT SERPL: 9.7 (ref 7–25)
CALCIUM SPEC-SCNC: 9.7 MG/DL (ref 8.6–10.5)
CHLORIDE SERPL-SCNC: 103 MMOL/L (ref 98–107)
CO2 SERPL-SCNC: 28 MMOL/L (ref 22–29)
CREAT SERPL-MCNC: 1.03 MG/DL (ref 0.57–1)
EGFRCR SERPLBLD CKD-EPI 2021: 64.3 ML/MIN/1.73
GLOBULIN UR ELPH-MCNC: 2.9 GM/DL
GLUCOSE SERPL-MCNC: 77 MG/DL (ref 65–99)
POTASSIUM SERPL-SCNC: 4.6 MMOL/L (ref 3.5–5.2)
PROT SERPL-MCNC: 6.9 G/DL (ref 6–8.5)
SODIUM SERPL-SCNC: 138 MMOL/L (ref 136–145)
T4 FREE SERPL-MCNC: 1.21 NG/DL (ref 0.93–1.7)
TSH SERPL DL<=0.05 MIU/L-ACNC: 3.18 UIU/ML (ref 0.27–4.2)

## 2023-02-09 DIAGNOSIS — R74.8 ELEVATED ALKALINE PHOSPHATASE LEVEL: Primary | ICD-10-CM

## 2023-02-13 ENCOUNTER — LAB (OUTPATIENT)
Dept: LAB | Facility: HOSPITAL | Age: 58
End: 2023-02-13
Payer: COMMERCIAL

## 2023-02-13 DIAGNOSIS — R74.8 ELEVATED ALKALINE PHOSPHATASE LEVEL: ICD-10-CM

## 2023-02-13 PROCEDURE — 84075 ASSAY ALKALINE PHOSPHATASE: CPT

## 2023-02-13 PROCEDURE — 36415 COLL VENOUS BLD VENIPUNCTURE: CPT

## 2023-02-13 PROCEDURE — 84080 ASSAY ALKALINE PHOSPHATASES: CPT

## 2023-02-15 LAB
ALP BONE CFR SERPL: 48 % (ref 14–68)
ALP INTEST CFR SERPL: 4 % (ref 0–18)
ALP LIVER CFR SERPL: 49 % (ref 18–85)
ALP SERPL-CCNC: 161 IU/L (ref 44–121)

## 2023-02-16 DIAGNOSIS — R74.8 ELEVATED ALKALINE PHOSPHATASE LEVEL: Primary | ICD-10-CM

## 2023-03-23 ENCOUNTER — HOSPITAL ENCOUNTER (OUTPATIENT)
Dept: ULTRASOUND IMAGING | Facility: HOSPITAL | Age: 58
Discharge: HOME | End: 2023-03-23
Admitting: NURSE PRACTITIONER
Payer: COMMERCIAL

## 2023-03-23 DIAGNOSIS — K80.20 GALLSTONES: Primary | ICD-10-CM

## 2023-03-23 DIAGNOSIS — R74.8 ELEVATED ALKALINE PHOSPHATASE LEVEL: ICD-10-CM

## 2023-03-23 PROCEDURE — 76705 ECHO EXAM OF ABDOMEN: CPT

## 2023-03-28 ENCOUNTER — OFFICE VISIT (OUTPATIENT)
Dept: SURGERY | Facility: CLINIC | Age: 58
End: 2023-03-28
Payer: COMMERCIAL

## 2023-03-28 VITALS — WEIGHT: 293 LBS | HEIGHT: 63 IN | BODY MASS INDEX: 51.91 KG/M2 | RESPIRATION RATE: 18 BRPM

## 2023-03-28 DIAGNOSIS — K80.20 CALCULUS OF GALLBLADDER WITHOUT CHOLECYSTITIS WITHOUT OBSTRUCTION: Primary | ICD-10-CM

## 2023-03-28 PROCEDURE — 99213 OFFICE O/P EST LOW 20 MIN: CPT | Performed by: SURGERY

## 2023-03-28 NOTE — PROGRESS NOTES
Inpatient History and Physical Surgical Orders    Preadmission Location:   Preadmission Time:  Facility:  Surgery Date:  Surgery Time:  Preadmission Test date:     Chief Complaint  Outpatient History and Physical / Surgical Orders    Primary Care Provider: Theresa Chacon APRN    Referring Provider: Theresa Chacon AP*    Subjective      Patient Name: Cher Quezada : 1965    HPI  The patient is a 58-year-old female that we have taken care of in the past.  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  He then had a subsequent ileostomy closure and also had an open repair of a large ventral hernia with abdominal wall mesh.  She has been having some episodic right upper quadrant pain and had a recent ultrasound that showed gallstones.    Past History:  Medical History: has a past medical history of Arthritis, Bipolar disorder ([facility]), Cholelithiasis, Chronic allergic rhinitis, Constipation, Hypothyroidism, Migraine, Mood disorder (HCC), Obesity, and Obstructive sleep apnea.   Surgical History: has a past surgical history that includes Colon surgery; Anal fissurectomy; Gastric bypass (); Hernia repair; Ileostomy revision; Rectal surgery (); and Tubal ligation.   Family History: family history includes Breast cancer (age of onset: 60) in her mother; Diabetes in her father and mother.   Social History: reports that she has never smoked. She has never used smokeless tobacco. She reports that she does not drink alcohol and does not use drugs.  Allergies: Nsaids, Sumatriptan, and Amoxicillin-pot clavulanate       Current Outpatient Medications:   •  ARIPiprazole (ABILIFY) 10 MG tablet, Take 1 tablet by mouth Daily., Disp: 30 tablet, Rfl: 5  •  buPROPion XL (WELLBUTRIN XL) 150 MG 24 hr tablet, Take 1 tablet by mouth Daily., Disp: 30 tablet, Rfl: 5  •  Cholecalciferol 25 MCG (1000 UT) capsule, Take 1 capsule by mouth Daily., Disp: , Rfl:  "  •  Cyanocobalamin ER 2000 MCG tablet controlled-release, Take 2,000 mcg by mouth Daily., Disp: , Rfl:   •  DULoxetine (CYMBALTA) 60 MG capsule, Take 1 capsule by mouth Daily., Disp: 30 capsule, Rfl: 5  •  levothyroxine (Synthroid) 75 MCG tablet, Take 1 tablet by mouth Daily., Disp: 30 tablet, Rfl: 5  •  loratadine (CLARITIN) 10 MG tablet, Take 1 tablet by mouth Daily., Disp: , Rfl:   •  rosuvastatin (Crestor) 5 MG tablet, Take 1 tablet by mouth Daily., Disp: 30 tablet, Rfl: 5  •  Zinc Sulfate 220 (50 Zn) MG tablet, Take 50 mg by mouth Daily., Disp: , Rfl:        Objective   Vital Signs:   Resp 18   Ht 160 cm (62.99\")   Wt (!) 140 kg (309 lb)   BMI 54.75 kg/m²       Physical Exam  Vitals and nursing note reviewed.   Constitutional:       Appearance: Normal appearance. The patient is well-developed.   Cardiovascular:      Rate and Rhythm: Normal rate and regular rhythm.   Pulmonary:      Effort: Pulmonary effort is normal.      Breath sounds: Normal air entry.   Abdominal:      General: Bowel sounds are normal.      Palpations: Abdomen is soft.      Skin:     General: Skin is warm and dry.   Neurological:      Mental Status: The patient is alert and oriented to person, place, and time.      Motor: Motor function is intact.   Psychiatric:         Mood and Affect: Mood normal.       Result Review :               Assessment and Plan   Diagnoses and all orders for this visit:    1. Calculus of gallbladder without cholecystitis without obstruction (Primary)    We will go ahead and order a CT scan of the abdomen and pelvis.  I have told her that I would prefer to make sure that she does not have any type of recurrent hernia and I would also like to get an idea of how bad her adhesions are before we talk about what we could offer in terms of her cholelithiasis.  I told her I would call her once we saw the CT scan and will make some further plans at that time.    I  Ryan Corbett MD  03/28/2023    "

## 2023-07-10 PROBLEM — R03.0 ELEVATED BLOOD PRESSURE READING: Status: ACTIVE | Noted: 2023-07-10

## 2023-08-02 ENCOUNTER — LAB (OUTPATIENT)
Dept: LAB | Facility: HOSPITAL | Age: 58
End: 2023-08-02
Payer: COMMERCIAL

## 2023-08-02 DIAGNOSIS — Z00.00 ANNUAL PHYSICAL EXAM: ICD-10-CM

## 2023-08-02 DIAGNOSIS — E78.5 HYPERLIPIDEMIA, UNSPECIFIED HYPERLIPIDEMIA TYPE: ICD-10-CM

## 2023-08-02 DIAGNOSIS — R73.03 PREDIABETES: ICD-10-CM

## 2023-08-02 LAB
ALBUMIN SERPL-MCNC: 3.7 G/DL (ref 3.5–5.2)
ALBUMIN/GLOB SERPL: 1.1 G/DL
ALP SERPL-CCNC: 155 U/L (ref 39–117)
ALT SERPL W P-5'-P-CCNC: 29 U/L (ref 1–33)
ANION GAP SERPL CALCULATED.3IONS-SCNC: 14.3 MMOL/L (ref 5–15)
AST SERPL-CCNC: 20 U/L (ref 1–32)
BASOPHILS # BLD AUTO: 0.03 10*3/MM3 (ref 0–0.2)
BASOPHILS NFR BLD AUTO: 0.5 % (ref 0–1.5)
BILIRUB SERPL-MCNC: 0.4 MG/DL (ref 0–1.2)
BUN SERPL-MCNC: 7 MG/DL (ref 6–20)
BUN/CREAT SERPL: 6.5 (ref 7–25)
CALCIUM SPEC-SCNC: 9.4 MG/DL (ref 8.6–10.5)
CHLORIDE SERPL-SCNC: 103 MMOL/L (ref 98–107)
CHOLEST SERPL-MCNC: 191 MG/DL (ref 0–200)
CO2 SERPL-SCNC: 21.7 MMOL/L (ref 22–29)
CREAT SERPL-MCNC: 1.08 MG/DL (ref 0.57–1)
DEPRECATED RDW RBC AUTO: 41 FL (ref 37–54)
EGFRCR SERPLBLD CKD-EPI 2021: 60.4 ML/MIN/1.73
EOSINOPHIL # BLD AUTO: 0.09 10*3/MM3 (ref 0–0.4)
EOSINOPHIL NFR BLD AUTO: 1.6 % (ref 0.3–6.2)
ERYTHROCYTE [DISTWIDTH] IN BLOOD BY AUTOMATED COUNT: 12 % (ref 12.3–15.4)
GLOBULIN UR ELPH-MCNC: 3.3 GM/DL
GLUCOSE SERPL-MCNC: 147 MG/DL (ref 65–99)
HBA1C MFR BLD: 6.4 % (ref 4.8–5.6)
HCT VFR BLD AUTO: 44 % (ref 34–46.6)
HDLC SERPL-MCNC: 70 MG/DL (ref 40–60)
HGB BLD-MCNC: 15 G/DL (ref 12–15.9)
IMM GRANULOCYTES # BLD AUTO: 0.03 10*3/MM3 (ref 0–0.05)
IMM GRANULOCYTES NFR BLD AUTO: 0.5 % (ref 0–0.5)
LDLC SERPL CALC-MCNC: 98 MG/DL (ref 0–100)
LDLC/HDLC SERPL: 1.36 {RATIO}
LYMPHOCYTES # BLD AUTO: 1.9 10*3/MM3 (ref 0.7–3.1)
LYMPHOCYTES NFR BLD AUTO: 33 % (ref 19.6–45.3)
MCH RBC QN AUTO: 32.1 PG (ref 26.6–33)
MCHC RBC AUTO-ENTMCNC: 34.1 G/DL (ref 31.5–35.7)
MCV RBC AUTO: 94.2 FL (ref 79–97)
MONOCYTES # BLD AUTO: 0.43 10*3/MM3 (ref 0.1–0.9)
MONOCYTES NFR BLD AUTO: 7.5 % (ref 5–12)
NEUTROPHILS NFR BLD AUTO: 3.28 10*3/MM3 (ref 1.7–7)
NEUTROPHILS NFR BLD AUTO: 56.9 % (ref 42.7–76)
NRBC BLD AUTO-RTO: 0 /100 WBC (ref 0–0.2)
PLATELET # BLD AUTO: 338 10*3/MM3 (ref 140–450)
PMV BLD AUTO: 10.7 FL (ref 6–12)
POTASSIUM SERPL-SCNC: 4.5 MMOL/L (ref 3.5–5.2)
PROT SERPL-MCNC: 7 G/DL (ref 6–8.5)
RBC # BLD AUTO: 4.67 10*6/MM3 (ref 3.77–5.28)
SODIUM SERPL-SCNC: 139 MMOL/L (ref 136–145)
T4 FREE SERPL-MCNC: 1.14 NG/DL (ref 0.93–1.7)
TRIGL SERPL-MCNC: 130 MG/DL (ref 0–150)
TSH SERPL DL<=0.05 MIU/L-ACNC: 3.5 UIU/ML (ref 0.27–4.2)
VLDLC SERPL-MCNC: 23 MG/DL (ref 5–40)
WBC NRBC COR # BLD: 5.76 10*3/MM3 (ref 3.4–10.8)

## 2023-08-02 PROCEDURE — 36415 COLL VENOUS BLD VENIPUNCTURE: CPT

## 2023-08-02 PROCEDURE — 80061 LIPID PANEL: CPT

## 2023-08-02 PROCEDURE — 84439 ASSAY OF FREE THYROXINE: CPT

## 2023-08-02 PROCEDURE — 83036 HEMOGLOBIN GLYCOSYLATED A1C: CPT

## 2023-08-02 PROCEDURE — 80050 GENERAL HEALTH PANEL: CPT

## 2023-09-06 ENCOUNTER — APPOINTMENT (OUTPATIENT)
Dept: CT IMAGING | Facility: HOSPITAL | Age: 58
DRG: 660 | End: 2023-09-06
Payer: COMMERCIAL

## 2023-09-06 ENCOUNTER — PREP FOR SURGERY (OUTPATIENT)
Dept: OTHER | Facility: HOSPITAL | Age: 58
End: 2023-09-06
Payer: COMMERCIAL

## 2023-09-06 ENCOUNTER — TELEPHONE (OUTPATIENT)
Dept: FAMILY MEDICINE CLINIC | Facility: CLINIC | Age: 58
End: 2023-09-06
Payer: COMMERCIAL

## 2023-09-06 ENCOUNTER — HOSPITAL ENCOUNTER (INPATIENT)
Facility: HOSPITAL | Age: 58
LOS: 2 days | Discharge: HOME | DRG: 660 | End: 2023-09-08
Attending: EMERGENCY MEDICINE | Admitting: INTERNAL MEDICINE
Payer: COMMERCIAL

## 2023-09-06 DIAGNOSIS — N20.1 URETEROLITHIASIS: Primary | ICD-10-CM

## 2023-09-06 DIAGNOSIS — Q62.11 HYDRONEPHROSIS WITH URETEROPELVIC JUNCTION (UPJ) OBSTRUCTION: ICD-10-CM

## 2023-09-06 PROBLEM — N13.2 URETERAL STONE WITH HYDRONEPHROSIS: Status: ACTIVE | Noted: 2023-09-06

## 2023-09-06 LAB
ALBUMIN SERPL-MCNC: 4.1 G/DL (ref 3.5–5.2)
ALBUMIN/GLOB SERPL: 1.2 G/DL
ALP SERPL-CCNC: 135 U/L (ref 39–117)
ALT SERPL W P-5'-P-CCNC: 31 U/L (ref 1–33)
ANION GAP SERPL CALCULATED.3IONS-SCNC: 12.1 MMOL/L (ref 5–15)
AST SERPL-CCNC: 21 U/L (ref 1–32)
BACTERIA UR QL AUTO: ABNORMAL /HPF
BASOPHILS # BLD AUTO: 0.03 10*3/MM3 (ref 0–0.2)
BASOPHILS NFR BLD AUTO: 0.2 % (ref 0–1.5)
BILIRUB SERPL-MCNC: 0.5 MG/DL (ref 0–1.2)
BILIRUB UR QL STRIP: NEGATIVE
BUN SERPL-MCNC: 13 MG/DL (ref 6–20)
BUN/CREAT SERPL: 9.5 (ref 7–25)
CALCIUM SPEC-SCNC: 9.7 MG/DL (ref 8.6–10.5)
CHLORIDE SERPL-SCNC: 101 MMOL/L (ref 98–107)
CLARITY UR: ABNORMAL
CO2 SERPL-SCNC: 22.9 MMOL/L (ref 22–29)
COLOR UR: YELLOW
CREAT SERPL-MCNC: 1.37 MG/DL (ref 0.57–1)
D-LACTATE SERPL-SCNC: 2.1 MMOL/L (ref 0.5–2)
D-LACTATE SERPL-SCNC: 2.9 MMOL/L (ref 0.5–2)
D-LACTATE SERPL-SCNC: 3 MMOL/L (ref 0.5–2)
D-LACTATE SERPL-SCNC: 3.4 MMOL/L (ref 0.5–2)
DEPRECATED RDW RBC AUTO: 42.8 FL (ref 37–54)
EGFRCR SERPLBLD CKD-EPI 2021: 45.4 ML/MIN/1.73
EOSINOPHIL # BLD AUTO: 0 10*3/MM3 (ref 0–0.4)
EOSINOPHIL NFR BLD AUTO: 0 % (ref 0.3–6.2)
ERYTHROCYTE [DISTWIDTH] IN BLOOD BY AUTOMATED COUNT: 12.3 % (ref 12.3–15.4)
GLOBULIN UR ELPH-MCNC: 3.4 GM/DL
GLUCOSE SERPL-MCNC: 145 MG/DL (ref 65–99)
GLUCOSE UR STRIP-MCNC: NEGATIVE MG/DL
HCT VFR BLD AUTO: 44.6 % (ref 34–46.6)
HGB BLD-MCNC: 14.8 G/DL (ref 12–15.9)
HGB UR QL STRIP.AUTO: ABNORMAL
HOLD SPECIMEN: NORMAL
HOLD SPECIMEN: NORMAL
HYALINE CASTS UR QL AUTO: ABNORMAL /LPF
IMM GRANULOCYTES # BLD AUTO: 0.05 10*3/MM3 (ref 0–0.05)
IMM GRANULOCYTES NFR BLD AUTO: 0.4 % (ref 0–0.5)
KETONES UR QL STRIP: ABNORMAL
LEUKOCYTE ESTERASE UR QL STRIP.AUTO: ABNORMAL
LIPASE SERPL-CCNC: 29 U/L (ref 13–60)
LYMPHOCYTES # BLD AUTO: 0.29 10*3/MM3 (ref 0.7–3.1)
LYMPHOCYTES NFR BLD AUTO: 2.3 % (ref 19.6–45.3)
MCH RBC QN AUTO: 31.6 PG (ref 26.6–33)
MCHC RBC AUTO-ENTMCNC: 33.2 G/DL (ref 31.5–35.7)
MCV RBC AUTO: 95.3 FL (ref 79–97)
MONOCYTES # BLD AUTO: 0.46 10*3/MM3 (ref 0.1–0.9)
MONOCYTES NFR BLD AUTO: 3.6 % (ref 5–12)
NEUTROPHILS NFR BLD AUTO: 12 10*3/MM3 (ref 1.7–7)
NEUTROPHILS NFR BLD AUTO: 93.5 % (ref 42.7–76)
NITRITE UR QL STRIP: NEGATIVE
NRBC BLD AUTO-RTO: 0 /100 WBC (ref 0–0.2)
PH UR STRIP.AUTO: 5.5 [PH] (ref 5–8)
PLATELET # BLD AUTO: 269 10*3/MM3 (ref 140–450)
PMV BLD AUTO: 10.4 FL (ref 6–12)
POTASSIUM SERPL-SCNC: 4.5 MMOL/L (ref 3.5–5.2)
PROT SERPL-MCNC: 7.5 G/DL (ref 6–8.5)
PROT UR QL STRIP: ABNORMAL
RBC # BLD AUTO: 4.68 10*6/MM3 (ref 3.77–5.28)
RBC # UR STRIP: ABNORMAL /HPF
REF LAB TEST METHOD: ABNORMAL
SODIUM SERPL-SCNC: 136 MMOL/L (ref 136–145)
SP GR UR STRIP: 1.02 (ref 1–1.03)
SQUAMOUS #/AREA URNS HPF: ABNORMAL /HPF
TRI-PHOS CRY URNS QL MICRO: ABNORMAL /HPF
UROBILINOGEN UR QL STRIP: ABNORMAL
WBC # UR STRIP: ABNORMAL /HPF
WBC NRBC COR # BLD: 12.83 10*3/MM3 (ref 3.4–10.8)
WHOLE BLOOD HOLD COAG: NORMAL
WHOLE BLOOD HOLD SPECIMEN: NORMAL

## 2023-09-06 PROCEDURE — 80053 COMPREHEN METABOLIC PANEL: CPT | Performed by: EMERGENCY MEDICINE

## 2023-09-06 PROCEDURE — 25010000002 ONDANSETRON PER 1 MG: Performed by: EMERGENCY MEDICINE

## 2023-09-06 PROCEDURE — 83690 ASSAY OF LIPASE: CPT | Performed by: EMERGENCY MEDICINE

## 2023-09-06 PROCEDURE — 36415 COLL VENOUS BLD VENIPUNCTURE: CPT | Performed by: EMERGENCY MEDICINE

## 2023-09-06 PROCEDURE — 25810000003 SODIUM CHLORIDE 0.9 % SOLUTION: Performed by: INTERNAL MEDICINE

## 2023-09-06 PROCEDURE — 81001 URINALYSIS AUTO W/SCOPE: CPT | Performed by: EMERGENCY MEDICINE

## 2023-09-06 PROCEDURE — 85025 COMPLETE CBC W/AUTO DIFF WBC: CPT | Performed by: EMERGENCY MEDICINE

## 2023-09-06 PROCEDURE — 36415 COLL VENOUS BLD VENIPUNCTURE: CPT

## 2023-09-06 PROCEDURE — 99223 1ST HOSP IP/OBS HIGH 75: CPT | Performed by: INTERNAL MEDICINE

## 2023-09-06 PROCEDURE — 99285 EMERGENCY DEPT VISIT HI MDM: CPT

## 2023-09-06 PROCEDURE — G0378 HOSPITAL OBSERVATION PER HR: HCPCS

## 2023-09-06 PROCEDURE — 87086 URINE CULTURE/COLONY COUNT: CPT | Performed by: EMERGENCY MEDICINE

## 2023-09-06 PROCEDURE — 25010000002 CEFTRIAXONE PER 250 MG: Performed by: EMERGENCY MEDICINE

## 2023-09-06 PROCEDURE — 87040 BLOOD CULTURE FOR BACTERIA: CPT | Performed by: EMERGENCY MEDICINE

## 2023-09-06 PROCEDURE — 96365 THER/PROPH/DIAG IV INF INIT: CPT

## 2023-09-06 PROCEDURE — 25010000002 KETOROLAC TROMETHAMINE PER 15 MG: Performed by: EMERGENCY MEDICINE

## 2023-09-06 PROCEDURE — 25010000002 MORPHINE PER 10 MG: Performed by: EMERGENCY MEDICINE

## 2023-09-06 PROCEDURE — 96375 TX/PRO/DX INJ NEW DRUG ADDON: CPT

## 2023-09-06 PROCEDURE — 83605 ASSAY OF LACTIC ACID: CPT | Performed by: EMERGENCY MEDICINE

## 2023-09-06 PROCEDURE — 74176 CT ABD & PELVIS W/O CONTRAST: CPT

## 2023-09-06 RX ORDER — SODIUM CHLORIDE 9 MG/ML
100 INJECTION, SOLUTION INTRAVENOUS CONTINUOUS
Status: DISCONTINUED | OUTPATIENT
Start: 2023-09-06 | End: 2023-09-06

## 2023-09-06 RX ORDER — SODIUM CHLORIDE 0.9 % (FLUSH) 0.9 %
10 SYRINGE (ML) INJECTION AS NEEDED
Status: CANCELLED | OUTPATIENT
Start: 2023-09-06

## 2023-09-06 RX ORDER — ONDANSETRON 2 MG/ML
4 INJECTION INTRAMUSCULAR; INTRAVENOUS ONCE
Status: COMPLETED | OUTPATIENT
Start: 2023-09-06 | End: 2023-09-06

## 2023-09-06 RX ORDER — CEFTRIAXONE SODIUM 1 G/50ML
1000 INJECTION, SOLUTION INTRAVENOUS ONCE
Status: COMPLETED | OUTPATIENT
Start: 2023-09-06 | End: 2023-09-06

## 2023-09-06 RX ORDER — SODIUM CHLORIDE 0.9 % (FLUSH) 0.9 %
10 SYRINGE (ML) INJECTION EVERY 12 HOURS SCHEDULED
Status: CANCELLED | OUTPATIENT
Start: 2023-09-06

## 2023-09-06 RX ORDER — SODIUM CHLORIDE 0.9 % (FLUSH) 0.9 %
10 SYRINGE (ML) INJECTION AS NEEDED
Status: DISCONTINUED | OUTPATIENT
Start: 2023-09-06 | End: 2023-09-08 | Stop reason: HOSPADM

## 2023-09-06 RX ORDER — SODIUM CHLORIDE 9 MG/ML
100 INJECTION, SOLUTION INTRAVENOUS CONTINUOUS
Status: DISCONTINUED | OUTPATIENT
Start: 2023-09-06 | End: 2023-09-07

## 2023-09-06 RX ORDER — SODIUM CHLORIDE 9 MG/ML
40 INJECTION, SOLUTION INTRAVENOUS AS NEEDED
Status: CANCELLED | OUTPATIENT
Start: 2023-09-06

## 2023-09-06 RX ORDER — SODIUM CHLORIDE 9 MG/ML
100 INJECTION, SOLUTION INTRAVENOUS CONTINUOUS
Status: CANCELLED | OUTPATIENT
Start: 2023-09-06

## 2023-09-06 RX ORDER — OXYCODONE AND ACETAMINOPHEN 7.5; 325 MG/1; MG/1
1 TABLET ORAL EVERY 6 HOURS PRN
Status: DISCONTINUED | OUTPATIENT
Start: 2023-09-06 | End: 2023-09-08 | Stop reason: HOSPADM

## 2023-09-06 RX ORDER — ACETAMINOPHEN 325 MG/1
650 TABLET ORAL EVERY 4 HOURS PRN
Status: DISCONTINUED | OUTPATIENT
Start: 2023-09-06 | End: 2023-09-08 | Stop reason: HOSPADM

## 2023-09-06 RX ORDER — SODIUM CHLORIDE 0.9 % (FLUSH) 0.9 %
10 SYRINGE (ML) INJECTION EVERY 12 HOURS SCHEDULED
Status: DISCONTINUED | OUTPATIENT
Start: 2023-09-06 | End: 2023-09-08 | Stop reason: HOSPADM

## 2023-09-06 RX ORDER — KETOROLAC TROMETHAMINE 30 MG/ML
30 INJECTION, SOLUTION INTRAMUSCULAR; INTRAVENOUS ONCE
Status: COMPLETED | OUTPATIENT
Start: 2023-09-06 | End: 2023-09-06

## 2023-09-06 RX ORDER — LEVOFLOXACIN 5 MG/ML
500 INJECTION, SOLUTION INTRAVENOUS ONCE
Status: CANCELLED | OUTPATIENT
Start: 2023-09-06 | End: 2023-09-06

## 2023-09-06 RX ORDER — ONDANSETRON 2 MG/ML
4 INJECTION INTRAMUSCULAR; INTRAVENOUS EVERY 6 HOURS PRN
Status: DISCONTINUED | OUTPATIENT
Start: 2023-09-06 | End: 2023-09-08 | Stop reason: HOSPADM

## 2023-09-06 RX ORDER — CEFTRIAXONE SODIUM 1 G/50ML
1000 INJECTION, SOLUTION INTRAVENOUS EVERY 24 HOURS
Status: COMPLETED | OUTPATIENT
Start: 2023-09-07 | End: 2023-09-08

## 2023-09-06 RX ORDER — SODIUM CHLORIDE 9 MG/ML
40 INJECTION, SOLUTION INTRAVENOUS AS NEEDED
Status: DISCONTINUED | OUTPATIENT
Start: 2023-09-06 | End: 2023-09-08 | Stop reason: HOSPADM

## 2023-09-06 RX ADMIN — SODIUM CHLORIDE 100 ML/HR: 9 INJECTION, SOLUTION INTRAVENOUS at 23:37

## 2023-09-06 RX ADMIN — CEFTRIAXONE SODIUM 1000 MG: 1 INJECTION, SOLUTION INTRAVENOUS at 14:24

## 2023-09-06 RX ADMIN — ACETAMINOPHEN 650 MG: 325 TABLET ORAL at 23:36

## 2023-09-06 RX ADMIN — ONDANSETRON 4 MG: 2 INJECTION INTRAMUSCULAR; INTRAVENOUS at 12:35

## 2023-09-06 RX ADMIN — SODIUM CHLORIDE 2000 ML: 9 INJECTION, SOLUTION INTRAVENOUS at 18:30

## 2023-09-06 RX ADMIN — Medication 10 ML: at 20:30

## 2023-09-06 RX ADMIN — MORPHINE SULFATE 4 MG: 4 INJECTION, SOLUTION INTRAMUSCULAR; INTRAVENOUS at 12:34

## 2023-09-06 RX ADMIN — SODIUM CHLORIDE 1000 ML: 9 INJECTION, SOLUTION INTRAVENOUS at 12:35

## 2023-09-06 RX ADMIN — KETOROLAC TROMETHAMINE 30 MG: 30 INJECTION, SOLUTION INTRAMUSCULAR; INTRAVENOUS at 14:34

## 2023-09-06 NOTE — H&P
[facility]   Urology HISTORY AND PHYSICAL    Patient Name: Cher Quezada  : 1965  MRN: [id number]  Primary Care Physician:  Theresa Chacon APRN  Date of admission: (Not on file)    Subjective   Subjective       History of Present Illness  The patient has a proximal 7 mm left ureteral stone and presents for left ureteroscopy, laser lithotripsy, left ureteral stent placement.      Personal History     Past Medical History:   Diagnosis Date    Arthritis     Bipolar disorder     Cholelithiasis     Chronic allergic rhinitis     Constipation     Hypothyroidism     Migraine     Mood disorder     Obesity     Obstructive sleep apnea        Past Surgical History:   Procedure Laterality Date    ANAL FISSURECTOMY      COLON SURGERY      ABDOMINAL    GASTRIC BYPASS      HERNIA REPAIR      ILEOSTOMY REVISION      REVERSAL    RECTAL SURGERY      FISTULA    TUBAL ABDOMINAL LIGATION         Family History: family history includes Breast cancer (age of onset: 60) in her mother; Diabetes in her father and mother. Otherwise pertinent FHx was reviewed and not pertinent to current issue.    Social History:  reports that she has never smoked. She has never used smokeless tobacco. She reports that she does not drink alcohol and does not use drugs.    Home Medications:  ARIPiprazole, Cholecalciferol, Cyanocobalamin ER, DULoxetine, Zinc Sulfate, buPROPion XL, levothyroxine, loratadine, and rosuvastatin    Allergies:  Allergies   Allergen Reactions    Amoxicillin-Pot Clavulanate Rash    Nsaids GI Intolerance    Sumatriptan Hallucinations       Objective    Objective     Vitals:   Temp:  [98.5 °F (36.9 °C)] 98.5 °F (36.9 °C)  Heart Rate:  [] 92  Resp:  [18] 18  BP: (144)/(92) 144/92    Physical Exam  Constitutional:       Appearance: Normal appearance.   Cardiovascular:      Rate and Rhythm: Normal rate and regular rhythm.   Pulmonary:      Effort: Pulmonary effort is normal.      Breath sounds: Normal  breath sounds.   Neurological:      Mental Status: She is alert. Mental status is at baseline.   Psychiatric:         Mood and Affect: Mood and affect normal.         Speech: Speech normal.         Judgment: Judgment normal.       Result Review    Result Review:  I have personally reviewed the results from the time of this admission to 9/6/2023 15:18 EDT and agree with these findings:  [x]  Laboratory  []  Microbiology  [x]  Radiology  []  EKG/Telemetry   []  Cardiology/Vascular   []  Pathology  [x]  Old records  []  Other:      Assessment & Plan   Assessment / Plan       Active Hospital Problems:  There are no active hospital problems to display for this patient.      Plan: left ureteroscopy, laser lithotripsy, left ureteral stent placement  Risks and benefits discussed with patient and they are agreeable to proceed.    DVT prophylaxis:  No DVT prophylaxis order currently exists.    CODE STATUS:           Electronically signed by Ernestine Navarrete MD, 09/06/23, 3:18 PM EDT.

## 2023-09-06 NOTE — TELEPHONE ENCOUNTER
Pt called with C/O   severe stomach pain with nausea. PT advised to go to ER.    Detail Level: Simple Detail Level: Zone

## 2023-09-06 NOTE — PAYOR COMM NOTE
"Bertha Quezada (58 y.o. Female)     PATIENT INFORMATION  Name:  Bertha Quezada  MRN#:     [id number]  :  1965       ADMISSION INFORMATION  CLASS: Inpatient   DOS:  2023      CURRENT ATTENDING PROVIDER INFORMATION  Name/NPI: Migel Celis DO [id number]  Phone:  Phone: [phone]      RENDERING FACILITY  Name:  [facility]   NPI:  [id number]  TID:  [id number]  Address:      [address] [place], [place] [address]  Phone  [phone]      CASE MANAGEMENT CONTACT INFORMATION  Phone:      [phone]  Fax:           [phone]              Date of Birth   1965    Social Security Number       Address   [address] [place], [place] [address]    Home Phone   [phone]    MRN   [id number]       [employer]   Non-Voodoo    Marital Status                               Admission Date   23    Admission Type   Emergency    Admitting Provider   Migel Celis DO    Attending Provider   Migel Celis DO    Department, Room/Bed   [facility] 5TH FLOOR SURGICAL TELEMETRY UNIT, 504/1       Discharge Date       Discharge Disposition       Discharge Destination                                 Attending Provider: Migel Celis DO    Allergies: Amoxicillin-pot Clavulanate, Nsaids, Sumatriptan    Isolation: None   Infection: None   Code Status: CPR    Ht: 160 cm (63\")   Wt: 140 kg (309 lb 1.4 oz)    Admission Cmt: None   Principal Problem: Ureteral stone with hydronephrosis [N13.2]                   Active Insurance as of 2023       Primary Coverage       Payor Plan Insurance Group Employer/Plan Group    [facility] [employer]S [employer] HMO TERRY ONLY [id number]       Payor Plan Address Payor Plan Phone Number Payor Plan Fax Number Effective Dates    [address]   2023 - None Entered    [place] [address]         Subscriber Name Subscriber Birth Date Member ID       BERTHA QUEZADA 1965 [id number]                    Renal " Colic and Kidney Stones [facility] Inpatient Care by Radha Rausch, RN       Indications Met: Reviewed on 2023 by Radha Rausch RN       Created Using Review Status Review Entered   [facility] for Case Management Primary Completed 2023       Created By   Radha Rausch, RN       Criteria Set Name - Subset   Renal Colic and Kidney Stones [facility] Inpatient Care      Criteria Review   Renal Colic and Kidney Stones [facility] Inpatient Care     Overall Determination: Indications Met     Criteria:  [×] Admission is indicated for  1 or more  of the following :      [×] Urinary tract infection          2023  5:36 PM              -- 2023  5:36 PM by Radha Rausch RN --                  UA: Mod blood; Trace leukocytes; 2+ bacteria. Per CT: + Hydronephrosis.     Notes:  -- 2023  5:36 PM by Radha Rausch RN --      To ED c/o L flank pain with N/V. In ED: L CVA tenderness on exam.             PMHx: Gastic bypass; partial colectomy due to c. diff. Bipolar D/O; hypothyroidism; DAVID.             ED results: CT: 7 mm x 4 mm calcification in the proximal left ureter at the UPJ with moderate left       hydronephrosis. UA: Tr ketones; Moderate blood; Trace leukocytes; 2+ bacteria. Creatinine 1.37 (baseline 0.98-1.03); GFR 45 (baseline 60.4). Lactate 2.9. WBC 12.83.             IN ED: Toradol IV; Morphine IV; Zofran IV; IV NS 1000ml bolus; Rocephin IV.             Admit: Urology consult; NPO pMN; IV NS @100/hr; Rocephin IV qd; Morphine IV prn; Zofran IV prn; Labs in AM. Plan for URETEROSCOPY LASER LITHOTRIPSY W/STENT INSERTION in the AM.                                   History & Physical        Migel Celis DO at 23 1430           [facility]  [facility]   Patient Name: Cher Quezada  : 1965  MRN: [id number]  Primary Care Physician:  Theresa Chacon, APRN  Date of admission: 2023    Subjective   Subjective     Chief Complaint: Left flank  pain    HPI:    Cher Quezada is a 58 y.o. female with morbid obesity, hypothyroidism, HLD, anxiety depression who presented with left-sided flank pain associated with nausea.  Last night around 9 PM she experienced sudden onset left-sided severe sharp flank pain that radiated to the groin.  No previous history.  No history of kidney stones.  No dysuria, suprapubic pain or hematuria.  No fever or chills. On presentation afebrile, pulse 105, respiratory rate 18, blood pressure 144/92, SPO2 100% on room air. WBC 12.83.  Lactate 2.9.  Creatinine 1.37 baseline of about 1. She received 1 L NS, Rocephin and dose of Toradol and morphine with significant improvement in pain.  She appeared comfortable at time of my exam.  Results of imaging were discussed with plans for urological intervention tomorrow and patient is agreeable.  She was admitted under the hospitalist service for further evaluation and management      Personal History     Past Medical History:  HTN  Hypothyroidism  Anxiety and depression    Past Surgical History:  Past Surgical History:   Procedure Laterality Date    ANAL FISSURECTOMY      COLON SURGERY      ABDOMINAL    GASTRIC BYPASS  2006    HERNIA REPAIR      ILEOSTOMY REVISION      REVERSAL    RECTAL SURGERY  1994    FISTULA    TUBAL ABDOMINAL LIGATION         Family History:   Family History   Problem Relation Age of Onset    Diabetes Mother     Breast cancer Mother 60    Diabetes Father         Social History:   Social History     Socioeconomic History    Marital status:    Tobacco Use    Smoking status: Never    Smokeless tobacco: Never    Tobacco comments:     NO SECOND HAND SMOKE EXPOSURE   Vaping Use    Vaping Use: Never used   Substance and Sexual Activity    Alcohol use: Never    Drug use: Never    Sexual activity: Defer         Home Medications:  ARIPiprazole, Cholecalciferol, Cyanocobalamin ER, DULoxetine, Zinc Sulfate, buPROPion XL, levothyroxine, loratadine, and  rosuvastatin    Allergies:  Allergies   Allergen Reactions    Amoxicillin-Pot Clavulanate Rash    Nsaids GI Intolerance    Sumatriptan Hallucinations       Review of Systems  Constitutional:  No Fever, No Chills  HEENT:  Denied complaints  Cardiovascular: Denied complaints  Respiratory:  Denied complaints  Gastrointestinal:  No Nausea, No Vomiting, + L sided flank pain  Genitourinary:  No Dysuria, No Hesitancy, No hematuria  Musculoskeletal:  Denied complaints  Neuro:  Denied complaints  Heme/Lymph:  Denied complaints    Objective   Objective     Vitals:   Temp:  [98.5 °F (36.9 °C)] 98.5 °F (36.9 °C)  Heart Rate:  [] 82  Resp:  [18] 18  BP: (144)/(92) 144/92    Physical Exam    Constitutional: conversant, no acute distress   Eyes: Pupils equal and reactive, no conjunctival injection   HENT: NCAT, nares patent, mucous membranes moist   Neck: Supple, trachea midline   Respiratory: Equal and clear to auscultation bilaterally, nonlabored respirations    Cardiovascular: RRR, no murmurs, no pedal edema   Gastrointestinal: Positive bowel sounds, soft, nontender, nondistended   Musculoskeletal: No gross joint deformities, no clubbing or cyanosis to extremities   Neurologic: Alert, Cranial Nerves grossly intact, speech clear   Skin: Warm, no rashes or wounds     Result Review    Result Review:  I have personally reviewed the results from the time of this admission to 9/6/2023 14:31 EDT and agree with these findings:  [x]  Laboratory  CBC          8/2/2023    09:24 9/6/2023    12:03   CBC   WBC 5.76  12.83    RBC 4.67  4.68    Hemoglobin 15.0  14.8    Hematocrit 44.0  44.6    MCV 94.2  95.3    MCH 32.1  31.6    MCHC 34.1  33.2    RDW 12.0  12.3    Platelets 338  269      CMP          2/13/2023    13:11 8/2/2023    09:24 9/6/2023    12:03   CMP   Glucose  147  145    BUN  7  13    Creatinine  1.08  1.37    EGFR  60.4  45.4    Sodium  139  136    Potassium  4.5  4.5    Chloride  103  101    Calcium  9.4  9.7    Total  Protein  7.0  7.5    Albumin  3.7  4.1    Globulin  3.3  3.4    Total Bilirubin  0.4  0.5    Alkaline Phosphatase 161  155  135    AST (SGOT)  20  21    ALT (SGPT)  29  31    Albumin/Globulin Ratio  1.1  1.2    BUN/Creatinine Ratio  6.5  9.5    Anion Gap  14.3  12.1        Lactate 2.9    []  Microbiology  [x]  Radiology  [x]  EKG/Telemetry   []  Cardiology/Vascular   []  Pathology  []  Old records  []  Other:      Assessment & Plan   Assessment / Plan     Assessment/Plan:   Ureterolithiasis  Left-sided hydronephrosis  Elevated lactate  Renal insufficiency  Mild Leukocytosis  History of hypothyrodism      Admit to hospitalist service.  MedSurg bed  Continue  cc/h  Trend lactate until normalized  Continue Rocephin 1 g daily  Follow-up blood and urine culture  Supportive care with antiemetic and pain control  Avoid further NSAIDs given renal insufficiency  Urology on board.  Planning for stent placement tomorrow  N.p.o. after midnight  SCDs  CBC, BMP in a.m.    DVT prophylaxis:  No DVT prophylaxis order currently exists.    CODE STATUS:    Code Status (Patient has no pulse and is not breathing): CPR (Attempt to Resuscitate)  Medical Interventions (Patient has pulse or is breathing): Full Support      Admission Status:  I believe this patient meets INPATIENT status.    Electronically signed by Migel Celis DO, 09/06/23, 2:31 PM EDT.               Electronically signed by Migel Celis DO at 09/06/23 1535          Emergency Department Notes        Umberto Yu MD at 09/06/23 1434          Time: 2:34 PM EDT  Date of encounter:  9/6/2023  Independent Historian/Clinical History and Information was obtained by:   Patient    History is limited by: N/A    Chief Complaint: Flank pain      History of Present Illness:  Patient is a 58 y.o. year old female who presents to the emergency department for evaluation of left flank pain.  Patient reports sudden onset of pain with nausea vomiting today.  She denies prior history  of kidney stones.  Denies dysuria.  She also denies fever.  She does report prior abdominal surgeries of gastric bypass and partial colectomy due to C. difficile.    HPI    Patient Care Team  Primary Care Provider: Theresa Chacon APRN    Past Medical History:     Allergies   Allergen Reactions    Amoxicillin-Pot Clavulanate Rash    Nsaids GI Intolerance    Sumatriptan Hallucinations     Past Medical History:   Diagnosis Date    Arthritis     Bipolar disorder     Cholelithiasis     Chronic allergic rhinitis     Constipation     Hypothyroidism     Migraine     Mood disorder     Obesity     Obstructive sleep apnea      Past Surgical History:   Procedure Laterality Date    ANAL FISSURECTOMY      COLON SURGERY      ABDOMINAL    GASTRIC BYPASS  2006    HERNIA REPAIR      ILEOSTOMY REVISION      REVERSAL    RECTAL SURGERY  1994    FISTULA    TUBAL ABDOMINAL LIGATION       Family History   Problem Relation Age of Onset    Diabetes Mother     Breast cancer Mother 60    Diabetes Father        Home Medications:  Prior to Admission medications    Medication Sig Start Date End Date Taking? Authorizing Provider   ARIPiprazole (ABILIFY) 10 MG tablet Take 1 tablet by mouth Daily. 7/10/23   Theresa Chacon APRN   buPROPion XL (WELLBUTRIN XL) 150 MG 24 hr tablet Take 1 tablet by mouth Daily. 7/10/23   Theresa Chacon APRN   Cholecalciferol 25 MCG (1000 UT) capsule Take 1 capsule by mouth Daily.    Provider, MD Marlon   Cyanocobalamin ER 2000 MCG tablet controlled-release Take 2,000 mcg by mouth Daily.    Provider, MD Marlon   DULoxetine (CYMBALTA) 60 MG capsule Take 1 capsule by mouth Daily. 7/10/23   Theresa Chacon APRN   levothyroxine (Synthroid) 75 MCG tablet Take 1 tablet by mouth Daily. 7/10/23   Theresa Chacon APRN   loratadine (CLARITIN) 10 MG tablet Take 1 tablet by mouth Daily.    ProviderMarlon MD   rosuvastatin (Crestor) 5 MG tablet Take 1 tablet by mouth Daily. 7/10/23    "Theresa Chacon APRN   Zinc Sulfate 220 (50 Zn) MG tablet Take 50 mg by mouth Daily.    Provider, MD Marlon   methocarbamol (ROBAXIN) 500 MG tablet Take 1 tablet by mouth 3 (Three) Times a Day. 7/10/23 9/6/23  Theresa Chacon APRN   methylPREDNISolone (MEDROL) 4 MG dose pack Take as directed on package instructions. 7/10/23 9/6/23  Theresa Chacon APRN        Social History:   Social History     Tobacco Use    Smoking status: Never    Smokeless tobacco: Never    Tobacco comments:     NO SECOND HAND SMOKE EXPOSURE   Vaping Use    Vaping Use: Never used   Substance Use Topics    Alcohol use: Never    Drug use: Never         Review of Systems:  Review of Systems   Constitutional:  Negative for chills and fever.   HENT:  Negative for congestion, rhinorrhea and sore throat.    Eyes:  Negative for pain and visual disturbance.   Respiratory:  Negative for apnea, cough, chest tightness and shortness of breath.    Cardiovascular:  Negative for chest pain and palpitations.   Gastrointestinal:  Positive for nausea and vomiting. Negative for abdominal pain and diarrhea.   Genitourinary:  Positive for flank pain. Negative for difficulty urinating and dysuria.   Musculoskeletal:  Negative for joint swelling and myalgias.   Skin:  Negative for color change.   Neurological:  Negative for seizures and headaches.   Psychiatric/Behavioral: Negative.     All other systems reviewed and are negative.     Physical Exam:  /92   Pulse 82   Temp 98.5 °F (36.9 °C)   Resp 18   Ht 160 cm (63\")   Wt (!) 140 kg (309 lb 8.4 oz)   SpO2 99%   BMI 54.83 kg/m²     Physical Exam  Vitals and nursing note reviewed.   Constitutional:       General: She is not in acute distress.     Appearance: Normal appearance. She is not toxic-appearing.   HENT:      Head: Normocephalic and atraumatic.      Jaw: There is normal jaw occlusion.   Eyes:      General: Lids are normal.      Extraocular Movements: Extraocular movements intact. "      Conjunctiva/sclera: Conjunctivae normal.      Pupils: Pupils are equal, round, and reactive to light.   Cardiovascular:      Rate and Rhythm: Normal rate and regular rhythm.      Pulses: Normal pulses.      Heart sounds: Normal heart sounds.   Pulmonary:      Effort: Pulmonary effort is normal. No respiratory distress.      Breath sounds: Normal breath sounds. No wheezing or rhonchi.   Abdominal:      General: Abdomen is flat.      Palpations: Abdomen is soft.      Tenderness: There is no abdominal tenderness. There is left CVA tenderness. There is no guarding or rebound.   Musculoskeletal:         General: Normal range of motion.      Cervical back: Normal range of motion and neck supple.      Right lower leg: No edema.      Left lower leg: No edema.   Skin:     General: Skin is warm and dry.   Neurological:      Mental Status: She is alert and oriented to person, place, and time. Mental status is at baseline.   Psychiatric:         Mood and Affect: Mood normal.                Procedures:  Procedures      Medical Decision Making:      Comorbidities that affect care:    Obesity    External Notes reviewed:    None      The following orders were placed and all results were independently analyzed by me:  Orders Placed This Encounter   Procedures    Blood Culture - Blood,    Blood Culture - Blood,    Urine Culture - Urine,    CT Abdomen Pelvis Without Contrast    [place] Draw    Comprehensive Metabolic Panel    Lipase    Urinalysis With Microscopic If Indicated (No Culture) - Urine, Clean Catch    Lactic Acid, Plasma    CBC Auto Differential    STAT Lactic Acid, Reflex    Urinalysis, Microscopic Only - Urine, Clean Catch    NPO Diet NPO Type: Strict NPO    Undress & Gown    Urology (on-call MD unless specified)    Inpatient Hospitalist Consult    Insert Peripheral IV    CBC & Differential    Green Top (Gel)    Lavender Top    Gold Top - SST    Light Blue Top       Medications Given in the Emergency  Department:  Medications   sodium chloride 0.9 % flush 10 mL (has no administration in time range)   cefTRIAXone (ROCEPHIN) IVPB 1,000 mg (1,000 mg Intravenous New Bag 9/6/23 1424)   morphine injection 4 mg (4 mg Intravenous Given 9/6/23 1234)   ondansetron (ZOFRAN) injection 4 mg (4 mg Intravenous Given 9/6/23 1235)   sodium chloride 0.9 % bolus 1,000 mL (1,000 mL Intravenous New Bag 9/6/23 1235)   ketorolac (TORADOL) injection 30 mg (30 mg Intravenous Given 9/6/23 1434)        ED Course:         Labs:    Lab Results (last 24 hours)       Procedure Component Value Units Date/Time    CBC & Differential [[id number]]  (Abnormal) Collected: 09/06/23 1203    Specimen: Blood Updated: 09/06/23 1206    Narrative:      The following orders were created for panel order CBC & Differential.  Procedure                               Abnormality         Status                     ---------                               -----------         ------                     CBC Auto Differential[[id number]]        Abnormal            Final result                 Please view results for these tests on the individual orders.    Comprehensive Metabolic Panel [[id number]]  (Abnormal) Collected: 09/06/23 1203    Specimen: Blood Updated: 09/06/23 1227     Glucose 145 mg/dL      BUN 13 mg/dL      Creatinine 1.37 mg/dL      Sodium 136 mmol/L      Potassium 4.5 mmol/L      Comment: Slight hemolysis detected by analyzer. Results may be affected.        Chloride 101 mmol/L      CO2 22.9 mmol/L      Calcium 9.7 mg/dL      Total Protein 7.5 g/dL      Albumin 4.1 g/dL      ALT (SGPT) 31 U/L      AST (SGOT) 21 U/L      Alkaline Phosphatase 135 U/L      Total Bilirubin 0.5 mg/dL      Globulin 3.4 gm/dL      A/G Ratio 1.2 g/dL      BUN/Creatinine Ratio 9.5     Anion Gap 12.1 mmol/L      eGFR 45.4 mL/min/1.73     Narrative:      GFR Normal >60  Chronic Kidney Disease <60  Kidney Failure <15      Lipase [[id number]]  (Normal) Collected: 09/06/23 1203     Specimen: Blood Updated: 09/06/23 1227     Lipase 29 U/L     Lactic Acid, Plasma [[id number]]  (Abnormal) Collected: 09/06/23 1203    Specimen: Blood Updated: 09/06/23 1236     Lactate 2.9 mmol/L     CBC Auto Differential [[id number]]  (Abnormal) Collected: 09/06/23 1203    Specimen: Blood Updated: 09/06/23 1206     WBC 12.83 10*3/mm3      RBC 4.68 10*6/mm3      Hemoglobin 14.8 g/dL      Hematocrit 44.6 %      MCV 95.3 fL      MCH 31.6 pg      MCHC 33.2 g/dL      RDW 12.3 %      RDW-SD 42.8 fl      MPV 10.4 fL      Platelets 269 10*3/mm3      Neutrophil % 93.5 %      Lymphocyte % 2.3 %      Monocyte % 3.6 %      Eosinophil % 0.0 %      Basophil % 0.2 %      Immature Grans % 0.4 %      Neutrophils, Absolute 12.00 10*3/mm3      Lymphocytes, Absolute 0.29 10*3/mm3      Monocytes, Absolute 0.46 10*3/mm3      Eosinophils, Absolute 0.00 10*3/mm3      Basophils, Absolute 0.03 10*3/mm3      Immature Grans, Absolute 0.05 10*3/mm3      nRBC 0.0 /100 WBC     Urinalysis With Microscopic If Indicated (No Culture) - Urine, Clean Catch [[id number]]  (Abnormal) Collected: 09/06/23 1244    Specimen: Urine, Clean Catch Updated: 09/06/23 1318     Color, UA Yellow     Appearance, UA Cloudy     pH, UA 5.5     Specific Gravity, UA 1.023     Glucose, UA Negative     Ketones, UA Trace     Bilirubin, UA Negative     Blood, UA Moderate (2+)     Protein, UA Trace     Leuk Esterase, UA Trace     Nitrite, UA Negative     Urobilinogen, UA 1.0 E.U./dL    Urinalysis, Microscopic Only - Urine, Clean Catch [[id number]]  (Abnormal) Collected: 09/06/23 1244    Specimen: Urine, Clean Catch Updated: 09/06/23 1318     RBC, UA 6-12 /HPF      WBC, UA 6-12 /HPF      Bacteria, UA 2+ /HPF      Squamous Epithelial Cells, UA 3-6 /HPF      Hyaline Casts, UA 3-6 /LPF      Triple Phosphate Crystals, UA Small/1+ /HPF      Methodology Manual Light Microscopy    Urine Culture - Urine, Urine, Clean Catch [[id number]] Collected: 09/06/23 1244    Specimen: Urine, Clean Catch  Updated: 09/06/23 1350    STAT Lactic Acid, Reflex [[id number]] Collected: 09/06/23 1425    Specimen: Blood Updated: 09/06/23 1429    Blood Culture - Blood, Arm, Left [[id number]] Collected: 09/06/23 1425    Specimen: Blood from Arm, Left Updated: 09/06/23 1429             Imaging:    CT Abdomen Pelvis Without Contrast    Result Date: 9/6/2023  PROCEDURE: CT ABDOMEN PELVIS WO CONTRAST  COMPARISON: [facility], CT, ABD PEL W/O CONTRAST, 9/05/2017, 15:47.  INDICATIONS: LEFT flank pain  PROTOCOL:   Standard imaging protocol performed    RADIATION:   DLP: 1846.7mGy*cm   Automated exposure control was utilized to minimize radiation dose.  TECHNIQUE: Axial images of the abdomen and pelvis without intravenous or oral contrast.  FINDINGS:  ABDOMEN: The lung bases are clear.  The unenhanced liver, spleen, pancreas, adrenal glands and right kidney are normal.  There are no inflammatory changes around the gallbladder.  Prior gastric bypass procedure.  There is a 7 mm x 4 mm calcification in the proximal left ureter at the UPJ with moderate left hydronephrosis.  There is diastasis of the rectus muscles with a large ventral hernia defect containing loops of unobstructed bowel.  PELVIS: No calcifications are identified in the distal ureters or urinary bladder.  The uterus and adnexa have a normal CT appearance.  Degenerative changes are present in the lumbar spine.  The abdominal aorta has a normal caliber.  No evidence of abnormal free fluid or adenopathy.  Prior partial colectomy.  IMPRESSION:  7 mm x 4 mm calcification in the proximal left ureter at the UPJ with moderate left hydronephrosis.   LONNIE SMITH MD       Electronically Signed and Approved By: LONNIE SMITH MD on 9/06/2023 at 13:02                Differential Diagnosis and Discussion:    Abdominal Pain: Based on the patient's signs and symptoms, I considered abdominal aortic aneurysm, small bowel obstruction, pancreatitis, acute cholecystitis, acute  appendecitis, peptic ulcer disease, gastritis, colitis, endocrine disorders, irritable bowel syndrome and other differential diagnosis an etiology of the patient's abdominal pain.    All labs were reviewed and interpreted by me.  CT scan radiology impression was interpreted by me.    MDM  Number of Diagnoses or Management Options  Hydronephrosis with ureteropelvic junction (UPJ) obstruction  Ureterolithiasis  Diagnosis management comments: In summary this is a 58-year-old female who presents to the emergency department for evaluation of sudden onset left flank pain.  CBC remarkable for leukocytosis.  CMP independently reviewed by me and shows no critical abnormalities except mild elevation of creatinine.  Urinalysis probably positive for UTI given WBCs, bacteria but only 3-6 epithelial cells.  CT scan of the abdomen pelvis reveals 7 x 4 mm retrolithiasis at the left UPJ causing moderate hydronephrosis and obstruction.  Patient's been given pain control in the emergency department.  Urology has been consulted and patient is willing to be admitted for stone retrieval.  Patient case has been discussed with the hospitalist team who will admit to the hospital for further evaluation and continuation of treatment.             Patient Care Considerations:    CT CHEST: I considered ordering a CT scan of the chest, however no respiratory distress      Consultants/Shared Management Plan:    Hospitalist: I have discussed the case with Dr. Celis who agrees to accept the patient for admission.  Consultant: I have discussed the case with Dr. Navarrete who agrees to consult on the patient.    Social Determinants of Health:    Patient is independent, reliable, and has access to care.       Disposition and Care Coordination:    Admit:   Through independent evaluation of the patient's history, physical, and imperical data, the patient meets criteria for observation/admission to the hospital.        Final diagnoses:   Ureterolithiasis    Hydronephrosis with ureteropelvic junction (UPJ) obstruction        ED Disposition       ED Disposition   Decision to Admit    Condition   --    Comment   --               This medical record created using voice recognition software.             Umberto Yu MD  09/06/23 1438      Electronically signed by Umberto Yu MD at 09/06/23 1438       Lab Results (last 24 hours)       Procedure Component Value Units Date/Time    STAT Lactic Acid, Reflex [[id number]] Collected: 09/06/23 1727    Specimen: Blood from Arm, Right Updated: 09/06/23 1734    Blood Culture - Blood, Arm, Left [[id number]] Collected: 09/06/23 1425    Specimen: Blood from Arm, Left Updated: 09/06/23 1641    STAT Lactic Acid, Reflex [[id number]]  (Abnormal) Collected: 09/06/23 1425    Specimen: Blood Updated: 09/06/23 1509     Lactate 2.1 mmol/L     Blood Culture - Blood, Arm, Left [[id number]] Collected: 09/06/23 1425    Specimen: Blood from Arm, Left Updated: 09/06/23 1429    Urine Culture - Urine, Urine, Clean Catch [[id number]] Collected: 09/06/23 1244    Specimen: Urine, Clean Catch Updated: 09/06/23 1350    Urinalysis With Microscopic If Indicated (No Culture) - Urine, Clean Catch [[id number]]  (Abnormal) Collected: 09/06/23 1244    Specimen: Urine, Clean Catch Updated: 09/06/23 1318     Color, UA Yellow     Appearance, UA Cloudy     pH, UA 5.5     Specific Gravity, UA 1.023     Glucose, UA Negative     Ketones, UA Trace     Bilirubin, UA Negative     Blood, UA Moderate (2+)     Protein, UA Trace     Leuk Esterase, UA Trace     Nitrite, UA Negative     Urobilinogen, UA 1.0 E.U./dL    Urinalysis, Microscopic Only - Urine, Clean Catch [[id number]]  (Abnormal) Collected: 09/06/23 1244    Specimen: Urine, Clean Catch Updated: 09/06/23 1318     RBC, UA 6-12 /HPF      WBC, UA 6-12 /HPF      Bacteria, UA 2+ /HPF      Squamous Epithelial Cells, UA 3-6 /HPF      Hyaline Casts, UA 3-6 /LPF      Triple Phosphate Crystals, UA Small/1+ /HPF      Methodology  Manual Light Microscopy    Lactic Acid, Plasma [[id number]]  (Abnormal) Collected: 09/06/23 1203    Specimen: Blood Updated: 09/06/23 1236     Lactate 2.9 mmol/L     Comprehensive Metabolic Panel [[id number]]  (Abnormal) Collected: 09/06/23 1203    Specimen: Blood Updated: 09/06/23 1227     Glucose 145 mg/dL      BUN 13 mg/dL      Creatinine 1.37 mg/dL      Sodium 136 mmol/L      Potassium 4.5 mmol/L      Comment: Slight hemolysis detected by analyzer. Results may be affected.        Chloride 101 mmol/L      CO2 22.9 mmol/L      Calcium 9.7 mg/dL      Total Protein 7.5 g/dL      Albumin 4.1 g/dL      ALT (SGPT) 31 U/L      AST (SGOT) 21 U/L      Alkaline Phosphatase 135 U/L      Total Bilirubin 0.5 mg/dL      Globulin 3.4 gm/dL      A/G Ratio 1.2 g/dL      BUN/Creatinine Ratio 9.5     Anion Gap 12.1 mmol/L      eGFR 45.4 mL/min/1.73     Narrative:      GFR Normal >60  Chronic Kidney Disease <60  Kidney Failure <15      Lipase [[id number]]  (Normal) Collected: 09/06/23 1203    Specimen: Blood Updated: 09/06/23 1227     Lipase 29 U/L     [place] Draw [[id number]] Collected: 09/06/23 1203    Specimen: Blood Updated: 09/06/23 1222    Narrative:      The following orders were created for panel order [place] Draw.  Procedure                               Abnormality         Status                     ---------                               -----------         ------                     Green Top (Gel)[[id number]]                                  Final result               Lavender Top[[id number]]                                     Final result               Gold Top - SST[[id number]]                                   Final result               Light Blue Top[[id number]]                                   Final result                 Please view results for these tests on the individual orders.    Lavender Top [[id number]] Collected: 09/06/23 1203    Specimen: Blood Updated: 09/06/23 1222     Extra Tube hold for add-on     Comment:  Auto resulted       Gold Top - SST [[id number]] Collected: 09/06/23 1203    Specimen: Blood Updated: 09/06/23 1222     Extra Tube Hold for add-ons.     Comment: Auto resulted.       Light Blue Top [[id number]] Collected: 09/06/23 1203    Specimen: Blood Updated: 09/06/23 1222     Extra Tube Hold for add-ons.     Comment: Auto resulted       Green Top (Gel) [[id number]] Collected: 09/06/23 1203    Specimen: Blood Updated: 09/06/23 1222     Extra Tube Hold for add-ons.     Comment: Auto resulted.       CBC & Differential [[id number]]  (Abnormal) Collected: 09/06/23 1203    Specimen: Blood Updated: 09/06/23 1206    Narrative:      The following orders were created for panel order CBC & Differential.  Procedure                               Abnormality         Status                     ---------                               -----------         ------                     CBC Auto Differential[[id number]]        Abnormal            Final result                 Please view results for these tests on the individual orders.    CBC Auto Differential [[id number]]  (Abnormal) Collected: 09/06/23 1203    Specimen: Blood Updated: 09/06/23 1206     WBC 12.83 10*3/mm3      RBC 4.68 10*6/mm3      Hemoglobin 14.8 g/dL      Hematocrit 44.6 %      MCV 95.3 fL      MCH 31.6 pg      MCHC 33.2 g/dL      RDW 12.3 %      RDW-SD 42.8 fl      MPV 10.4 fL      Platelets 269 10*3/mm3      Neutrophil % 93.5 %      Lymphocyte % 2.3 %      Monocyte % 3.6 %      Eosinophil % 0.0 %      Basophil % 0.2 %      Immature Grans % 0.4 %      Neutrophils, Absolute 12.00 10*3/mm3      Lymphocytes, Absolute 0.29 10*3/mm3      Monocytes, Absolute 0.46 10*3/mm3      Eosinophils, Absolute 0.00 10*3/mm3      Basophils, Absolute 0.03 10*3/mm3      Immature Grans, Absolute 0.05 10*3/mm3      nRBC 0.0 /100 WBC           Imaging Results (Last 24 Hours)       Procedure Component Value Units Date/Time    CT Abdomen Pelvis Without Contrast [[id number]] Collected: 09/06/23  1302     Updated: 09/06/23 1305    Narrative:      PROCEDURE: CT ABDOMEN PELVIS WO CONTRAST     COMPARISON: [facility], CT, ABD PEL W/O CONTRAST, 9/05/2017, 15:47.     INDICATIONS: LEFT flank pain     PROTOCOL:   Standard imaging protocol performed      RADIATION:   DLP: 1846.7mGy*cm    Automated exposure control was utilized to minimize radiation dose.      TECHNIQUE: Axial images of the abdomen and pelvis without intravenous or oral contrast.      FINDINGS:     ABDOMEN:  The lung bases are clear.  The unenhanced liver, spleen, pancreas, adrenal glands and right kidney   are normal.  There are no inflammatory changes around the gallbladder.  Prior gastric bypass   procedure.  There is a 7 mm x 4 mm calcification in the proximal left ureter at the UPJ with   moderate left hydronephrosis.  There is diastasis of the rectus muscles with a large ventral hernia   defect containing loops of unobstructed bowel.     PELVIS:  No calcifications are identified in the distal ureters or urinary bladder.  The uterus and adnexa   have a normal CT appearance.  Degenerative changes are present in the lumbar spine.  The abdominal   aorta has a normal caliber.  No evidence of abnormal free fluid or adenopathy.  Prior partial   colectomy.     IMPRESSION:  7 mm x 4 mm calcification in the proximal left ureter at the UPJ with moderate left   hydronephrosis.       LONNIE SMITH MD         Electronically Signed and Approved By: LONNIE SMITH MD on 9/06/2023 at 13:02

## 2023-09-06 NOTE — H&P (VIEW-ONLY)
[facility]   Urology HISTORY AND PHYSICAL    Patient Name: Cher Quezada  : 1965  MRN: [id number]  Primary Care Physician:  Theresa Chacon APRN  Date of admission: (Not on file)    Subjective   Subjective       History of Present Illness  The patient has a proximal 7 mm left ureteral stone and presents for left ureteroscopy, laser lithotripsy, left ureteral stent placement.      Personal History     Past Medical History:   Diagnosis Date   • Arthritis    • Bipolar disorder    • Cholelithiasis    • Chronic allergic rhinitis    • Constipation    • Hypothyroidism    • Migraine    • Mood disorder    • Obesity    • Obstructive sleep apnea        Past Surgical History:   Procedure Laterality Date   • ANAL FISSURECTOMY     • COLON SURGERY      ABDOMINAL   • GASTRIC BYPASS     • HERNIA REPAIR     • ILEOSTOMY REVISION      REVERSAL   • RECTAL SURGERY      FISTULA   • TUBAL ABDOMINAL LIGATION         Family History: family history includes Breast cancer (age of onset: 60) in her mother; Diabetes in her father and mother. Otherwise pertinent FHx was reviewed and not pertinent to current issue.    Social History:  reports that she has never smoked. She has never used smokeless tobacco. She reports that she does not drink alcohol and does not use drugs.    Home Medications:  ARIPiprazole, Cholecalciferol, Cyanocobalamin ER, DULoxetine, Zinc Sulfate, buPROPion XL, levothyroxine, loratadine, and rosuvastatin    Allergies:  Allergies   Allergen Reactions   • Amoxicillin-Pot Clavulanate Rash   • Nsaids GI Intolerance   • Sumatriptan Hallucinations       Objective    Objective     Vitals:   Temp:  [98.5 °F (36.9 °C)] 98.5 °F (36.9 °C)  Heart Rate:  [] 92  Resp:  [18] 18  BP: (144)/(92) 144/92    Physical Exam  Constitutional:       Appearance: Normal appearance.   Cardiovascular:      Rate and Rhythm: Normal rate and regular rhythm.   Pulmonary:      Effort: Pulmonary effort is normal.       Breath sounds: Normal breath sounds.   Neurological:      Mental Status: She is alert. Mental status is at baseline.   Psychiatric:         Mood and Affect: Mood and affect normal.         Speech: Speech normal.         Judgment: Judgment normal.       Result Review    Result Review:  I have personally reviewed the results from the time of this admission to 9/6/2023 15:18 EDT and agree with these findings:  [x]  Laboratory  []  Microbiology  [x]  Radiology  []  EKG/Telemetry   []  Cardiology/Vascular   []  Pathology  [x]  Old records  []  Other:      Assessment & Plan   Assessment / Plan       Active Hospital Problems:  There are no active hospital problems to display for this patient.      Plan: left ureteroscopy, laser lithotripsy, left ureteral stent placement  Risks and benefits discussed with patient and they are agreeable to proceed.    DVT prophylaxis:  No DVT prophylaxis order currently exists.    CODE STATUS:           Electronically signed by Ernestine Navarrete MD, 09/06/23, 3:18 PM EDT.

## 2023-09-06 NOTE — ED PROVIDER NOTES
Time: 2:34 PM EDT  Date of encounter:  9/6/2023  Independent Historian/Clinical History and Information was obtained by:   Patient    History is limited by: N/A    Chief Complaint: Flank pain      History of Present Illness:  Patient is a 58 y.o. year old female who presents to the emergency department for evaluation of left flank pain.  Patient reports sudden onset of pain with nausea vomiting today.  She denies prior history of kidney stones.  Denies dysuria.  She also denies fever.  She does report prior abdominal surgeries of gastric bypass and partial colectomy due to C. difficile.    HPI    Patient Care Team  Primary Care Provider: Theresa Chacon APRN    Past Medical History:     Allergies   Allergen Reactions    Amoxicillin-Pot Clavulanate Rash    Nsaids GI Intolerance    Sumatriptan Hallucinations     Past Medical History:   Diagnosis Date    Arthritis     Bipolar disorder     Cholelithiasis     Chronic allergic rhinitis     Constipation     Hypothyroidism     Migraine     Mood disorder     Obesity     Obstructive sleep apnea      Past Surgical History:   Procedure Laterality Date    ANAL FISSURECTOMY      COLON SURGERY      ABDOMINAL    GASTRIC BYPASS  2006    HERNIA REPAIR      ILEOSTOMY REVISION      REVERSAL    RECTAL SURGERY  1994    FISTULA    TUBAL ABDOMINAL LIGATION       Family History   Problem Relation Age of Onset    Diabetes Mother     Breast cancer Mother 60    Diabetes Father        Home Medications:  Prior to Admission medications    Medication Sig Start Date End Date Taking? Authorizing Provider   ARIPiprazole (ABILIFY) 10 MG tablet Take 1 tablet by mouth Daily. 7/10/23   Theresa Chacon APRN   buPROPion XL (WELLBUTRIN XL) 150 MG 24 hr tablet Take 1 tablet by mouth Daily. 7/10/23   Theresa Chacon APRN   Cholecalciferol 25 MCG (1000 UT) capsule Take 1 capsule by mouth Daily.    Provider, MD Marlon   Cyanocobalamin ER 2000 MCG tablet controlled-release Take 2,000 mcg by  "mouth Daily.    Provider, MD Marlon   DULoxetine (CYMBALTA) 60 MG capsule Take 1 capsule by mouth Daily. 7/10/23   Theresa Chacon APRN   levothyroxine (Synthroid) 75 MCG tablet Take 1 tablet by mouth Daily. 7/10/23   Theresa Chacon APRN   loratadine (CLARITIN) 10 MG tablet Take 1 tablet by mouth Daily.    Provider, MD Marlon   rosuvastatin (Crestor) 5 MG tablet Take 1 tablet by mouth Daily. 7/10/23   Theresa Chacon APRN   Zinc Sulfate 220 (50 Zn) MG tablet Take 50 mg by mouth Daily.    Provider, MD Marlon   methocarbamol (ROBAXIN) 500 MG tablet Take 1 tablet by mouth 3 (Three) Times a Day. 7/10/23 9/6/23  Theresa Chacon APRN   methylPREDNISolone (MEDROL) 4 MG dose pack Take as directed on package instructions. 7/10/23 9/6/23  Theresa Chacon APRN        Social History:   Social History     Tobacco Use    Smoking status: Never    Smokeless tobacco: Never    Tobacco comments:     NO SECOND HAND SMOKE EXPOSURE   Vaping Use    Vaping Use: Never used   Substance Use Topics    Alcohol use: Never    Drug use: Never         Review of Systems:  Review of Systems   Constitutional:  Negative for chills and fever.   HENT:  Negative for congestion, rhinorrhea and sore throat.    Eyes:  Negative for pain and visual disturbance.   Respiratory:  Negative for apnea, cough, chest tightness and shortness of breath.    Cardiovascular:  Negative for chest pain and palpitations.   Gastrointestinal:  Positive for nausea and vomiting. Negative for abdominal pain and diarrhea.   Genitourinary:  Positive for flank pain. Negative for difficulty urinating and dysuria.   Musculoskeletal:  Negative for joint swelling and myalgias.   Skin:  Negative for color change.   Neurological:  Negative for seizures and headaches.   Psychiatric/Behavioral: Negative.     All other systems reviewed and are negative.     Physical Exam:  /92   Pulse 82   Temp 98.5 °F (36.9 °C)   Resp 18   Ht 160 cm (63\")   " Wt (!) 140 kg (309 lb 8.4 oz)   SpO2 99%   BMI 54.83 kg/m²     Physical Exam  Vitals and nursing note reviewed.   Constitutional:       General: She is not in acute distress.     Appearance: Normal appearance. She is not toxic-appearing.   HENT:      Head: Normocephalic and atraumatic.      Jaw: There is normal jaw occlusion.   Eyes:      General: Lids are normal.      Extraocular Movements: Extraocular movements intact.      Conjunctiva/sclera: Conjunctivae normal.      Pupils: Pupils are equal, round, and reactive to light.   Cardiovascular:      Rate and Rhythm: Normal rate and regular rhythm.      Pulses: Normal pulses.      Heart sounds: Normal heart sounds.   Pulmonary:      Effort: Pulmonary effort is normal. No respiratory distress.      Breath sounds: Normal breath sounds. No wheezing or rhonchi.   Abdominal:      General: Abdomen is flat.      Palpations: Abdomen is soft.      Tenderness: There is no abdominal tenderness. There is left CVA tenderness. There is no guarding or rebound.   Musculoskeletal:         General: Normal range of motion.      Cervical back: Normal range of motion and neck supple.      Right lower leg: No edema.      Left lower leg: No edema.   Skin:     General: Skin is warm and dry.   Neurological:      Mental Status: She is alert and oriented to person, place, and time. Mental status is at baseline.   Psychiatric:         Mood and Affect: Mood normal.                Procedures:  Procedures      Medical Decision Making:      Comorbidities that affect care:    Obesity    External Notes reviewed:    None      The following orders were placed and all results were independently analyzed by me:  Orders Placed This Encounter   Procedures    Blood Culture - Blood,    Blood Culture - Blood,    Urine Culture - Urine,    CT Abdomen Pelvis Without Contrast    [place] Draw    Comprehensive Metabolic Panel    Lipase    Urinalysis With Microscopic If Indicated (No Culture) - Urine, Clean Catch     Lactic Acid, Plasma    CBC Auto Differential    STAT Lactic Acid, Reflex    Urinalysis, Microscopic Only - Urine, Clean Catch    NPO Diet NPO Type: Strict NPO    Undress & Gown    Urology (on-call MD unless specified)    Inpatient Hospitalist Consult    Insert Peripheral IV    CBC & Differential    Green Top (Gel)    Lavender Top    Gold Top - SST    Light Blue Top       Medications Given in the Emergency Department:  Medications   sodium chloride 0.9 % flush 10 mL (has no administration in time range)   cefTRIAXone (ROCEPHIN) IVPB 1,000 mg (1,000 mg Intravenous New Bag 9/6/23 1424)   morphine injection 4 mg (4 mg Intravenous Given 9/6/23 1234)   ondansetron (ZOFRAN) injection 4 mg (4 mg Intravenous Given 9/6/23 1235)   sodium chloride 0.9 % bolus 1,000 mL (1,000 mL Intravenous New Bag 9/6/23 1235)   ketorolac (TORADOL) injection 30 mg (30 mg Intravenous Given 9/6/23 1434)        ED Course:         Labs:    Lab Results (last 24 hours)       Procedure Component Value Units Date/Time    CBC & Differential [[id number]]  (Abnormal) Collected: 09/06/23 1203    Specimen: Blood Updated: 09/06/23 1206    Narrative:      The following orders were created for panel order CBC & Differential.  Procedure                               Abnormality         Status                     ---------                               -----------         ------                     CBC Auto Differential[[id number]]        Abnormal            Final result                 Please view results for these tests on the individual orders.    Comprehensive Metabolic Panel [[id number]]  (Abnormal) Collected: 09/06/23 1203    Specimen: Blood Updated: 09/06/23 1227     Glucose 145 mg/dL      BUN 13 mg/dL      Creatinine 1.37 mg/dL      Sodium 136 mmol/L      Potassium 4.5 mmol/L      Comment: Slight hemolysis detected by analyzer. Results may be affected.        Chloride 101 mmol/L      CO2 22.9 mmol/L      Calcium 9.7 mg/dL      Total Protein 7.5 g/dL       Albumin 4.1 g/dL      ALT (SGPT) 31 U/L      AST (SGOT) 21 U/L      Alkaline Phosphatase 135 U/L      Total Bilirubin 0.5 mg/dL      Globulin 3.4 gm/dL      A/G Ratio 1.2 g/dL      BUN/Creatinine Ratio 9.5     Anion Gap 12.1 mmol/L      eGFR 45.4 mL/min/1.73     Narrative:      GFR Normal >60  Chronic Kidney Disease <60  Kidney Failure <15      Lipase [[id number]]  (Normal) Collected: 09/06/23 1203    Specimen: Blood Updated: 09/06/23 1227     Lipase 29 U/L     Lactic Acid, Plasma [[id number]]  (Abnormal) Collected: 09/06/23 1203    Specimen: Blood Updated: 09/06/23 1236     Lactate 2.9 mmol/L     CBC Auto Differential [[id number]]  (Abnormal) Collected: 09/06/23 1203    Specimen: Blood Updated: 09/06/23 1206     WBC 12.83 10*3/mm3      RBC 4.68 10*6/mm3      Hemoglobin 14.8 g/dL      Hematocrit 44.6 %      MCV 95.3 fL      MCH 31.6 pg      MCHC 33.2 g/dL      RDW 12.3 %      RDW-SD 42.8 fl      MPV 10.4 fL      Platelets 269 10*3/mm3      Neutrophil % 93.5 %      Lymphocyte % 2.3 %      Monocyte % 3.6 %      Eosinophil % 0.0 %      Basophil % 0.2 %      Immature Grans % 0.4 %      Neutrophils, Absolute 12.00 10*3/mm3      Lymphocytes, Absolute 0.29 10*3/mm3      Monocytes, Absolute 0.46 10*3/mm3      Eosinophils, Absolute 0.00 10*3/mm3      Basophils, Absolute 0.03 10*3/mm3      Immature Grans, Absolute 0.05 10*3/mm3      nRBC 0.0 /100 WBC     Urinalysis With Microscopic If Indicated (No Culture) - Urine, Clean Catch [[id number]]  (Abnormal) Collected: 09/06/23 1244    Specimen: Urine, Clean Catch Updated: 09/06/23 1318     Color, UA Yellow     Appearance, UA Cloudy     pH, UA 5.5     Specific Gravity, UA 1.023     Glucose, UA Negative     Ketones, UA Trace     Bilirubin, UA Negative     Blood, UA Moderate (2+)     Protein, UA Trace     Leuk Esterase, UA Trace     Nitrite, UA Negative     Urobilinogen, UA 1.0 E.U./dL    Urinalysis, Microscopic Only - Urine, Clean Catch [[id number]]  (Abnormal) Collected: 09/06/23 1245     Specimen: Urine, Clean Catch Updated: 09/06/23 1318     RBC, UA 6-12 /HPF      WBC, UA 6-12 /HPF      Bacteria, UA 2+ /HPF      Squamous Epithelial Cells, UA 3-6 /HPF      Hyaline Casts, UA 3-6 /LPF      Triple Phosphate Crystals, UA Small/1+ /HPF      Methodology Manual Light Microscopy    Urine Culture - Urine, Urine, Clean Catch [[id number]] Collected: 09/06/23 1244    Specimen: Urine, Clean Catch Updated: 09/06/23 1350    STAT Lactic Acid, Reflex [[id number]] Collected: 09/06/23 1425    Specimen: Blood Updated: 09/06/23 1429    Blood Culture - Blood, Arm, Left [[id number]] Collected: 09/06/23 1425    Specimen: Blood from Arm, Left Updated: 09/06/23 1429             Imaging:    CT Abdomen Pelvis Without Contrast    Result Date: 9/6/2023  PROCEDURE: CT ABDOMEN PELVIS WO CONTRAST  COMPARISON: [facility], CT, ABD PEL W/O CONTRAST, 9/05/2017, 15:47.  INDICATIONS: LEFT flank pain  PROTOCOL:   Standard imaging protocol performed    RADIATION:   DLP: 1846.7mGy*cm   Automated exposure control was utilized to minimize radiation dose.  TECHNIQUE: Axial images of the abdomen and pelvis without intravenous or oral contrast.  FINDINGS:  ABDOMEN: The lung bases are clear.  The unenhanced liver, spleen, pancreas, adrenal glands and right kidney are normal.  There are no inflammatory changes around the gallbladder.  Prior gastric bypass procedure.  There is a 7 mm x 4 mm calcification in the proximal left ureter at the UPJ with moderate left hydronephrosis.  There is diastasis of the rectus muscles with a large ventral hernia defect containing loops of unobstructed bowel.  PELVIS: No calcifications are identified in the distal ureters or urinary bladder.  The uterus and adnexa have a normal CT appearance.  Degenerative changes are present in the lumbar spine.  The abdominal aorta has a normal caliber.  No evidence of abnormal free fluid or adenopathy.  Prior partial colectomy.  IMPRESSION:  7 mm x 4 mm  calcification in the proximal left ureter at the UPJ with moderate left hydronephrosis.   LONNIE SMITH MD       Electronically Signed and Approved By: LONNIE SMITH MD on 9/06/2023 at 13:02                Differential Diagnosis and Discussion:    Abdominal Pain: Based on the patient's signs and symptoms, I considered abdominal aortic aneurysm, small bowel obstruction, pancreatitis, acute cholecystitis, acute appendecitis, peptic ulcer disease, gastritis, colitis, endocrine disorders, irritable bowel syndrome and other differential diagnosis an etiology of the patient's abdominal pain.    All labs were reviewed and interpreted by me.  CT scan radiology impression was interpreted by me.    MDM  Number of Diagnoses or Management Options  Hydronephrosis with ureteropelvic junction (UPJ) obstruction  Ureterolithiasis  Diagnosis management comments: In summary this is a 58-year-old female who presents to the emergency department for evaluation of sudden onset left flank pain.  CBC remarkable for leukocytosis.  CMP independently reviewed by me and shows no critical abnormalities except mild elevation of creatinine.  Urinalysis probably positive for UTI given WBCs, bacteria but only 3-6 epithelial cells.  CT scan of the abdomen pelvis reveals 7 x 4 mm retrolithiasis at the left UPJ causing moderate hydronephrosis and obstruction.  Patient's been given pain control in the emergency department.  Urology has been consulted and patient is willing to be admitted for stone retrieval.  Patient case has been discussed with the hospitalist team who will admit to the hospital for further evaluation and continuation of treatment.             Patient Care Considerations:    CT CHEST: I considered ordering a CT scan of the chest, however no respiratory distress      Consultants/Shared Management Plan:    Hospitalist: I have discussed the case with Dr. Celis who agrees to accept the patient for admission.  Consultant: I have  discussed the case with Dr. Navarrete who agrees to consult on the patient.    Social Determinants of Health:    Patient is independent, reliable, and has access to care.       Disposition and Care Coordination:    Admit:   Through independent evaluation of the patient's history, physical, and imperical data, the patient meets criteria for observation/admission to the hospital.        Final diagnoses:   Ureterolithiasis   Hydronephrosis with ureteropelvic junction (UPJ) obstruction        ED Disposition       ED Disposition   Decision to Admit    Condition   --    Comment   --               This medical record created using voice recognition software.             Umberto Yu MD  09/06/23 [id number]

## 2023-09-06 NOTE — H&P
[facility]   HOSPITALIST HISTORY AND PHYSICAL  Date: 2023   Patient Name: Cher Quezada  : 1965  MRN: [id number]  Primary Care Physician:  Theresa Chacon APRN  Date of admission: 2023    Subjective   Subjective     Chief Complaint: Left flank pain    HPI:    Cher Quezada is a 58 y.o. female with morbid obesity, hypothyroidism, HLD, anxiety depression who presented with left-sided flank pain associated with nausea.  Last night around 9 PM she experienced sudden onset left-sided severe sharp flank pain that radiated to the groin.  No previous history.  No history of kidney stones.  No dysuria, suprapubic pain or hematuria.  No fever or chills. On presentation afebrile, pulse 105, respiratory rate 18, blood pressure 144/92, SPO2 100% on room air. WBC 12.83.  Lactate 2.9.  Creatinine 1.37 baseline of about 1. She received 1 L NS, Rocephin and dose of Toradol and morphine with significant improvement in pain.  She appeared comfortable at time of my exam.  Results of imaging were discussed with plans for urological intervention tomorrow and patient is agreeable.  She was admitted under the hospitalist service for further evaluation and management      Personal History     Past Medical History:  HTN  Hypothyroidism  Anxiety and depression    Past Surgical History:  Past Surgical History:   Procedure Laterality Date    ANAL FISSURECTOMY      COLON SURGERY      ABDOMINAL    GASTRIC BYPASS  2006    HERNIA REPAIR      ILEOSTOMY REVISION      REVERSAL    RECTAL SURGERY      FISTULA    TUBAL ABDOMINAL LIGATION         Family History:   Family History   Problem Relation Age of Onset    Diabetes Mother     Breast cancer Mother 60    Diabetes Father         Social History:   Social History     Socioeconomic History    Marital status:    Tobacco Use    Smoking status: Never    Smokeless tobacco: Never    Tobacco comments:     NO SECOND HAND SMOKE EXPOSURE   Vaping Use    Vaping  Use: Never used   Substance and Sexual Activity    Alcohol use: Never    Drug use: Never    Sexual activity: Defer         Home Medications:  ARIPiprazole, Cholecalciferol, Cyanocobalamin ER, DULoxetine, Zinc Sulfate, buPROPion XL, levothyroxine, loratadine, and rosuvastatin    Allergies:  Allergies   Allergen Reactions    Amoxicillin-Pot Clavulanate Rash    Nsaids GI Intolerance    Sumatriptan Hallucinations       Review of Systems  Constitutional:  No Fever, No Chills  HEENT:  Denied complaints  Cardiovascular: Denied complaints  Respiratory:  Denied complaints  Gastrointestinal:  No Nausea, No Vomiting, + L sided flank pain  Genitourinary:  No Dysuria, No Hesitancy, No hematuria  Musculoskeletal:  Denied complaints  Neuro:  Denied complaints  Heme/Lymph:  Denied complaints    Objective   Objective     Vitals:   Temp:  [98.5 °F (36.9 °C)] 98.5 °F (36.9 °C)  Heart Rate:  [] 82  Resp:  [18] 18  BP: (144)/(92) 144/92    Physical Exam    Constitutional: conversant, no acute distress   Eyes: Pupils equal and reactive, no conjunctival injection   HENT: NCAT, nares patent, mucous membranes moist   Neck: Supple, trachea midline   Respiratory: Equal and clear to auscultation bilaterally, nonlabored respirations    Cardiovascular: RRR, no murmurs, no pedal edema   Gastrointestinal: Positive bowel sounds, soft, nontender, nondistended   Musculoskeletal: No gross joint deformities, no clubbing or cyanosis to extremities   Neurologic: Alert, Cranial Nerves grossly intact, speech clear   Skin: Warm, no rashes or wounds     Result Review    Result Review:  I have personally reviewed the results from the time of this admission to 9/6/2023 14:31 EDT and agree with these findings:  [x]  Laboratory  CBC          8/2/2023    09:24 9/6/2023    12:03   CBC   WBC 5.76  12.83    RBC 4.67  4.68    Hemoglobin 15.0  14.8    Hematocrit 44.0  44.6    MCV 94.2  95.3    MCH 32.1  31.6    MCHC 34.1  33.2    RDW 12.0  12.3    Platelets 338   269      CMP          2/13/2023    13:11 8/2/2023    09:24 9/6/2023    12:03   CMP   Glucose  147  145    BUN  7  13    Creatinine  1.08  1.37    EGFR  60.4  45.4    Sodium  139  136    Potassium  4.5  4.5    Chloride  103  101    Calcium  9.4  9.7    Total Protein  7.0  7.5    Albumin  3.7  4.1    Globulin  3.3  3.4    Total Bilirubin  0.4  0.5    Alkaline Phosphatase 161  155  135    AST (SGOT)  20  21    ALT (SGPT)  29  31    Albumin/Globulin Ratio  1.1  1.2    BUN/Creatinine Ratio  6.5  9.5    Anion Gap  14.3  12.1        Lactate 2.9    []  Microbiology  [x]  Radiology  [x]  EKG/Telemetry   []  Cardiology/Vascular   []  Pathology  []  Old records  []  Other:      Assessment & Plan   Assessment / Plan     Assessment/Plan:   Ureterolithiasis  Left-sided hydronephrosis  Elevated lactate  Renal insufficiency  Mild Leukocytosis  History of hypothyrodism      Admit to hospitalist service.  MedSur bed  Continue  cc/h  Trend lactate until normalized  Continue Rocephin 1 g daily  Follow-up blood and urine culture  Supportive care with antiemetic and pain control  Avoid further NSAIDs given renal insufficiency  Urology on board.  Planning for stent placement tomorrow  N.p.o. after midnight  SCDs  CBC, BMP in a.m.    DVT prophylaxis:  No DVT prophylaxis order currently exists.    CODE STATUS:    Code Status (Patient has no pulse and is not breathing): CPR (Attempt to Resuscitate)  Medical Interventions (Patient has pulse or is breathing): Full Support      Admission Status:  I believe this patient meets INPATIENT status.    Electronically signed by Migel Celis DO, 09/06/23, 2:31 PM EDT.

## 2023-09-07 ENCOUNTER — APPOINTMENT (OUTPATIENT)
Dept: GENERAL RADIOLOGY | Facility: HOSPITAL | Age: 58
DRG: 660 | End: 2023-09-07
Payer: COMMERCIAL

## 2023-09-07 ENCOUNTER — ANESTHESIA EVENT (OUTPATIENT)
Dept: PERIOP | Facility: HOSPITAL | Age: 58
DRG: 660 | End: 2023-09-07
Payer: COMMERCIAL

## 2023-09-07 ENCOUNTER — ANESTHESIA (OUTPATIENT)
Dept: PERIOP | Facility: HOSPITAL | Age: 58
DRG: 660 | End: 2023-09-07
Payer: COMMERCIAL

## 2023-09-07 LAB
ANION GAP SERPL CALCULATED.3IONS-SCNC: 8.8 MMOL/L (ref 5–15)
BACTERIA SPEC AEROBE CULT: NORMAL
BUN SERPL-MCNC: 13 MG/DL (ref 6–20)
BUN/CREAT SERPL: 10.7 (ref 7–25)
CALCIUM SPEC-SCNC: 8.6 MG/DL (ref 8.6–10.5)
CHLORIDE SERPL-SCNC: 107 MMOL/L (ref 98–107)
CO2 SERPL-SCNC: 20.2 MMOL/L (ref 22–29)
CREAT SERPL-MCNC: 1.22 MG/DL (ref 0.57–1)
D-LACTATE SERPL-SCNC: 1.1 MMOL/L (ref 0.5–2)
DEPRECATED RDW RBC AUTO: 44.3 FL (ref 37–54)
EGFRCR SERPLBLD CKD-EPI 2021: 52.2 ML/MIN/1.73
ERYTHROCYTE [DISTWIDTH] IN BLOOD BY AUTOMATED COUNT: 12.7 % (ref 12.3–15.4)
GLUCOSE SERPL-MCNC: 139 MG/DL (ref 65–99)
HCT VFR BLD AUTO: 36.5 % (ref 34–46.6)
HGB BLD-MCNC: 12.5 G/DL (ref 12–15.9)
MCH RBC QN AUTO: 32.7 PG (ref 26.6–33)
MCHC RBC AUTO-ENTMCNC: 34.2 G/DL (ref 31.5–35.7)
MCV RBC AUTO: 95.5 FL (ref 79–97)
PLATELET # BLD AUTO: 226 10*3/MM3 (ref 140–450)
PMV BLD AUTO: 10.2 FL (ref 6–12)
POTASSIUM SERPL-SCNC: 3.7 MMOL/L (ref 3.5–5.2)
RBC # BLD AUTO: 3.82 10*6/MM3 (ref 3.77–5.28)
SODIUM SERPL-SCNC: 136 MMOL/L (ref 136–145)
WBC NRBC COR # BLD: 4.43 10*3/MM3 (ref 3.4–10.8)

## 2023-09-07 PROCEDURE — C1769 GUIDE WIRE: HCPCS | Performed by: UROLOGY

## 2023-09-07 PROCEDURE — G0378 HOSPITAL OBSERVATION PER HR: HCPCS

## 2023-09-07 PROCEDURE — 25010000002 DEXAMETHASONE PER 1 MG

## 2023-09-07 PROCEDURE — 96372 THER/PROPH/DIAG INJ SC/IM: CPT

## 2023-09-07 PROCEDURE — 25010000002 ONDANSETRON PER 1 MG: Performed by: UROLOGY

## 2023-09-07 PROCEDURE — 25010000002 ONDANSETRON PER 1 MG: Performed by: INTERNAL MEDICINE

## 2023-09-07 PROCEDURE — 25810000003 SODIUM CHLORIDE 0.9 % SOLUTION: Performed by: UROLOGY

## 2023-09-07 PROCEDURE — 25010000002 HEPARIN (PORCINE) PER 1000 UNITS: Performed by: INTERNAL MEDICINE

## 2023-09-07 PROCEDURE — 25010000002 CEFTRIAXONE PER 250 MG: Performed by: UROLOGY

## 2023-09-07 PROCEDURE — 96361 HYDRATE IV INFUSION ADD-ON: CPT

## 2023-09-07 PROCEDURE — 25010000002 MORPHINE PER 10 MG: Performed by: UROLOGY

## 2023-09-07 PROCEDURE — 80048 BASIC METABOLIC PNL TOTAL CA: CPT | Performed by: INTERNAL MEDICINE

## 2023-09-07 PROCEDURE — 82365 CALCULUS SPECTROSCOPY: CPT | Performed by: UROLOGY

## 2023-09-07 PROCEDURE — 25010000002 ONDANSETRON PER 1 MG

## 2023-09-07 PROCEDURE — 0T778DZ DILATION OF LEFT URETER WITH INTRALUMINAL DEVICE, VIA NATURAL OR ARTIFICIAL OPENING ENDOSCOPIC: ICD-10-PCS | Performed by: UROLOGY

## 2023-09-07 PROCEDURE — 0TC78ZZ EXTIRPATION OF MATTER FROM LEFT URETER, VIA NATURAL OR ARTIFICIAL OPENING ENDOSCOPIC: ICD-10-PCS | Performed by: UROLOGY

## 2023-09-07 PROCEDURE — 25010000002 FENTANYL CITRATE (PF) 50 MCG/ML SOLUTION

## 2023-09-07 PROCEDURE — C2617 STENT, NON-COR, TEM W/O DEL: HCPCS | Performed by: UROLOGY

## 2023-09-07 PROCEDURE — 25010000002 SUGAMMADEX 200 MG/2ML SOLUTION

## 2023-09-07 PROCEDURE — 25010000002 PROPOFOL 10 MG/ML EMULSION

## 2023-09-07 PROCEDURE — C1894 INTRO/SHEATH, NON-LASER: HCPCS | Performed by: UROLOGY

## 2023-09-07 PROCEDURE — 52356 CYSTO/URETERO W/LITHOTRIPSY: CPT | Performed by: UROLOGY

## 2023-09-07 PROCEDURE — 76000 FLUOROSCOPY <1 HR PHYS/QHP: CPT

## 2023-09-07 PROCEDURE — 88300 SURGICAL PATH GROSS: CPT | Performed by: UROLOGY

## 2023-09-07 PROCEDURE — 25010000002 MORPHINE PER 10 MG: Performed by: INTERNAL MEDICINE

## 2023-09-07 PROCEDURE — 83605 ASSAY OF LACTIC ACID: CPT | Performed by: EMERGENCY MEDICINE

## 2023-09-07 PROCEDURE — 25010000002 CEFTRIAXONE PER 250 MG: Performed by: INTERNAL MEDICINE

## 2023-09-07 PROCEDURE — 85027 COMPLETE CBC AUTOMATED: CPT | Performed by: INTERNAL MEDICINE

## 2023-09-07 PROCEDURE — C1758 CATHETER, URETERAL: HCPCS | Performed by: UROLOGY

## 2023-09-07 PROCEDURE — 96376 TX/PRO/DX INJ SAME DRUG ADON: CPT

## 2023-09-07 PROCEDURE — 99233 SBSQ HOSP IP/OBS HIGH 50: CPT | Performed by: INTERNAL MEDICINE

## 2023-09-07 DEVICE — URETERAL STENT
Type: IMPLANTABLE DEVICE | Site: URETER | Status: FUNCTIONAL
Brand: ASCERTA™

## 2023-09-07 RX ORDER — SODIUM CHLORIDE 0.9 % (FLUSH) 0.9 %
10 SYRINGE (ML) INJECTION EVERY 12 HOURS SCHEDULED
Status: DISCONTINUED | OUTPATIENT
Start: 2023-09-07 | End: 2023-09-07 | Stop reason: HOSPADM

## 2023-09-07 RX ORDER — SODIUM CHLORIDE 0.9 % (FLUSH) 0.9 %
10 SYRINGE (ML) INJECTION AS NEEDED
Status: DISCONTINUED | OUTPATIENT
Start: 2023-09-07 | End: 2023-09-07 | Stop reason: HOSPADM

## 2023-09-07 RX ORDER — SUCCINYLCHOLINE/SOD CL,ISO/PF 100 MG/5ML
SYRINGE (ML) INTRAVENOUS AS NEEDED
Status: DISCONTINUED | OUTPATIENT
Start: 2023-09-07 | End: 2023-09-07 | Stop reason: SURG

## 2023-09-07 RX ORDER — OXYCODONE HCL 5 MG/5 ML
5 SOLUTION, ORAL ORAL EVERY 4 HOURS PRN
Status: DISCONTINUED | OUTPATIENT
Start: 2023-09-07 | End: 2023-09-07 | Stop reason: HOSPADM

## 2023-09-07 RX ORDER — LIDOCAINE HYDROCHLORIDE 20 MG/ML
INJECTION, SOLUTION EPIDURAL; INFILTRATION; INTRACAUDAL; PERINEURAL AS NEEDED
Status: DISCONTINUED | OUTPATIENT
Start: 2023-09-07 | End: 2023-09-07 | Stop reason: SURG

## 2023-09-07 RX ORDER — FENTANYL CITRATE 50 UG/ML
INJECTION, SOLUTION INTRAMUSCULAR; INTRAVENOUS AS NEEDED
Status: DISCONTINUED | OUTPATIENT
Start: 2023-09-07 | End: 2023-09-07 | Stop reason: SURG

## 2023-09-07 RX ORDER — LEVOTHYROXINE SODIUM 0.07 MG/1
75 TABLET ORAL
Status: DISCONTINUED | OUTPATIENT
Start: 2023-09-08 | End: 2023-09-08 | Stop reason: HOSPADM

## 2023-09-07 RX ORDER — PROMETHAZINE HYDROCHLORIDE 12.5 MG/1
25 TABLET ORAL ONCE AS NEEDED
Status: DISCONTINUED | OUTPATIENT
Start: 2023-09-07 | End: 2023-09-07 | Stop reason: HOSPADM

## 2023-09-07 RX ORDER — ROCURONIUM BROMIDE 10 MG/ML
INJECTION, SOLUTION INTRAVENOUS AS NEEDED
Status: DISCONTINUED | OUTPATIENT
Start: 2023-09-07 | End: 2023-09-07 | Stop reason: SURG

## 2023-09-07 RX ORDER — PROMETHAZINE HYDROCHLORIDE 25 MG/1
25 SUPPOSITORY RECTAL ONCE AS NEEDED
Status: DISCONTINUED | OUTPATIENT
Start: 2023-09-07 | End: 2023-09-07 | Stop reason: HOSPADM

## 2023-09-07 RX ORDER — ONDANSETRON 2 MG/ML
INJECTION INTRAMUSCULAR; INTRAVENOUS AS NEEDED
Status: DISCONTINUED | OUTPATIENT
Start: 2023-09-07 | End: 2023-09-07 | Stop reason: SURG

## 2023-09-07 RX ORDER — BUPROPION HYDROCHLORIDE 150 MG/1
150 TABLET ORAL DAILY
Status: DISCONTINUED | OUTPATIENT
Start: 2023-09-07 | End: 2023-09-08 | Stop reason: HOSPADM

## 2023-09-07 RX ORDER — ARIPIPRAZOLE 10 MG/1
10 TABLET ORAL DAILY
Status: DISCONTINUED | OUTPATIENT
Start: 2023-09-07 | End: 2023-09-08 | Stop reason: HOSPADM

## 2023-09-07 RX ORDER — MAGNESIUM HYDROXIDE 1200 MG/15ML
LIQUID ORAL AS NEEDED
Status: DISCONTINUED | OUTPATIENT
Start: 2023-09-07 | End: 2023-09-07 | Stop reason: HOSPADM

## 2023-09-07 RX ORDER — SODIUM CHLORIDE 9 MG/ML
40 INJECTION, SOLUTION INTRAVENOUS AS NEEDED
Status: DISCONTINUED | OUTPATIENT
Start: 2023-09-07 | End: 2023-09-07 | Stop reason: HOSPADM

## 2023-09-07 RX ORDER — MEPERIDINE HYDROCHLORIDE 25 MG/ML
12.5 INJECTION INTRAMUSCULAR; INTRAVENOUS; SUBCUTANEOUS
Status: DISCONTINUED | OUTPATIENT
Start: 2023-09-07 | End: 2023-09-07 | Stop reason: HOSPADM

## 2023-09-07 RX ORDER — DULOXETIN HYDROCHLORIDE 30 MG/1
60 CAPSULE, DELAYED RELEASE ORAL DAILY
Status: DISCONTINUED | OUTPATIENT
Start: 2023-09-07 | End: 2023-09-08 | Stop reason: HOSPADM

## 2023-09-07 RX ORDER — ONDANSETRON 2 MG/ML
4 INJECTION INTRAMUSCULAR; INTRAVENOUS ONCE AS NEEDED
Status: DISCONTINUED | OUTPATIENT
Start: 2023-09-07 | End: 2023-09-07 | Stop reason: HOSPADM

## 2023-09-07 RX ORDER — HEPARIN SODIUM 5000 [USP'U]/ML
5000 INJECTION, SOLUTION INTRAVENOUS; SUBCUTANEOUS EVERY 8 HOURS SCHEDULED
Status: DISCONTINUED | OUTPATIENT
Start: 2023-09-07 | End: 2023-09-08 | Stop reason: HOSPADM

## 2023-09-07 RX ORDER — DEXAMETHASONE SODIUM PHOSPHATE 4 MG/ML
INJECTION, SOLUTION INTRA-ARTICULAR; INTRALESIONAL; INTRAMUSCULAR; INTRAVENOUS; SOFT TISSUE AS NEEDED
Status: DISCONTINUED | OUTPATIENT
Start: 2023-09-07 | End: 2023-09-07 | Stop reason: SURG

## 2023-09-07 RX ORDER — PROPOFOL 10 MG/ML
VIAL (ML) INTRAVENOUS AS NEEDED
Status: DISCONTINUED | OUTPATIENT
Start: 2023-09-07 | End: 2023-09-07 | Stop reason: SURG

## 2023-09-07 RX ORDER — SODIUM CHLORIDE 9 MG/ML
100 INJECTION, SOLUTION INTRAVENOUS CONTINUOUS
Status: DISCONTINUED | OUTPATIENT
Start: 2023-09-07 | End: 2023-09-07

## 2023-09-07 RX ORDER — LEVOFLOXACIN 5 MG/ML
500 INJECTION, SOLUTION INTRAVENOUS ONCE
Status: DISCONTINUED | OUTPATIENT
Start: 2023-09-07 | End: 2023-09-07 | Stop reason: HOSPADM

## 2023-09-07 RX ADMIN — SUGAMMADEX 400 MG: 100 INJECTION, SOLUTION INTRAVENOUS at 10:52

## 2023-09-07 RX ADMIN — ONDANSETRON 4 MG: 2 INJECTION INTRAMUSCULAR; INTRAVENOUS at 09:30

## 2023-09-07 RX ADMIN — ROCURONIUM BROMIDE 40 MG: 50 INJECTION INTRAVENOUS at 10:20

## 2023-09-07 RX ADMIN — PROPOFOL 200 MG: 10 INJECTION, EMULSION INTRAVENOUS at 10:08

## 2023-09-07 RX ADMIN — CEFTRIAXONE SODIUM 1000 MG: 1 INJECTION, SOLUTION INTRAVENOUS at 09:15

## 2023-09-07 RX ADMIN — OXYCODONE HYDROCHLORIDE AND ACETAMINOPHEN 1 TABLET: 7.5; 325 TABLET ORAL at 12:49

## 2023-09-07 RX ADMIN — FENTANYL CITRATE 50 MCG: 50 INJECTION, SOLUTION INTRAMUSCULAR; INTRAVENOUS at 10:08

## 2023-09-07 RX ADMIN — DULOXETINE HYDROCHLORIDE 60 MG: 30 CAPSULE, DELAYED RELEASE ORAL at 14:30

## 2023-09-07 RX ADMIN — Medication 100 MG: at 10:08

## 2023-09-07 RX ADMIN — Medication 10 ML: at 21:25

## 2023-09-07 RX ADMIN — MORPHINE SULFATE 4 MG: 4 INJECTION, SOLUTION INTRAMUSCULAR; INTRAVENOUS at 15:17

## 2023-09-07 RX ADMIN — SODIUM CHLORIDE 100 ML/HR: 9 INJECTION, SOLUTION INTRAVENOUS at 09:15

## 2023-09-07 RX ADMIN — HEPARIN SODIUM 5000 UNITS: 5000 INJECTION INTRAVENOUS; SUBCUTANEOUS at 17:39

## 2023-09-07 RX ADMIN — MORPHINE SULFATE 4 MG: 4 INJECTION, SOLUTION INTRAMUSCULAR; INTRAVENOUS at 05:51

## 2023-09-07 RX ADMIN — Medication 10 ML: at 09:15

## 2023-09-07 RX ADMIN — LIDOCAINE HYDROCHLORIDE 100 MG: 20 INJECTION, SOLUTION EPIDURAL; INFILTRATION; INTRACAUDAL; PERINEURAL at 10:08

## 2023-09-07 RX ADMIN — BUPROPION HYDROCHLORIDE 150 MG: 150 TABLET, EXTENDED RELEASE ORAL at 14:30

## 2023-09-07 RX ADMIN — ONDANSETRON 4 MG: 2 INJECTION INTRAMUSCULAR; INTRAVENOUS at 10:31

## 2023-09-07 RX ADMIN — DEXAMETHASONE SODIUM PHOSPHATE 8 MG: 4 INJECTION, SOLUTION INTRAMUSCULAR; INTRAVENOUS at 10:18

## 2023-09-07 RX ADMIN — ARIPIPRAZOLE 10 MG: 10 TABLET ORAL at 14:30

## 2023-09-07 NOTE — ANESTHESIA POSTPROCEDURE EVALUATION
Patient: Cher Quezada    Procedure Summary       Date: 09/07/23 Room / Location: [facility] OR 06 / [facility] MAIN OR    Anesthesia Start: 1001 Anesthesia Stop: 1105    Procedure: URETEROSCOPY LASER LITHOTRIPSY WITH STENT INSERTION (Left) Diagnosis:       Ureterolithiasis      (Ureterolithiasis [N20.1])    Surgeons: Ernestine Navarrete MD Provider: Chente Miranda MD    Anesthesia Type: general ASA Status: 3            Anesthesia Type: general    Vitals  Vitals Value Taken Time   /79 09/07/23 1125   Temp 36.1 °C (97 °F) 09/07/23 1125   Pulse 73 09/07/23 1125   Resp 16 09/07/23 1125   SpO2 96 % 09/07/23 1125           Post Anesthesia Care and Evaluation    Patient location during evaluation: bedside  Patient participation: complete - patient participated  Level of consciousness: awake  Pain management: adequate    Airway patency: patent  PONV Status: none  Cardiovascular status: acceptable and stable  Respiratory status: acceptable  Hydration status: acceptable    Comments: An Anesthesiologist personally participated in the most demanding procedures (including induction and emergence if applicable) in the anesthesia plan, monitored the course of anesthesia administration at frequent intervals and remained physically present and available for immediate diagnosis and treatment of emergencies.

## 2023-09-07 NOTE — PLAN OF CARE
Goal Outcome Evaluation:           Progress: no change  Outcome Evaluation: Pt. rested well this shift. Medicated once with Tylenol for c/o headache. Pt. npo for procedure today.

## 2023-09-07 NOTE — PROGRESS NOTES
[facility] Hospitalist Progress Note  Date: 2023  Patient Name: Cher Quezada  : 1965  MRN: [id number]  Date of admission: 2023      Subjective   Subjective     Chief Complaint: Follow up for left flank pain    Interval Followup:   Feeling fine.  No fever or chills.  No flank pain.  No abdominal pain.  No dysuria or suprapubic pain.  N.p.o. for ureteroscopy    Review of Systems  Cardiovascular:  No Chest Pain, No Edema  Respiratory:  No Cough, No Dyspnea  Gastrointestinal:  No Nausea, No Vomiting      Objective   Objective     Vitals:   Temp:  [97 °F (36.1 °C)-98.9 °F (37.2 °C)] 97.4 °F (36.3 °C)  Heart Rate:  [66-97] 73  Resp:  [16-27] 18  BP: (106-156)/(48-88) 151/88  FiO2 (%):  [96 %-97 %] 96 %  Physical Exam    Constitutional: WNWD, conversant, NAD   Eyes: Pupils equal and reactive, no conjunctival injections   HENT: NCAT, nares patent, MMM   Respiratory: Clear to auscultation bilaterally, nonlabored respirations    Cardiovascular: RRR, no murmurs, no edema   Gastrointestinal: Positive bowel sounds, soft, nontender, nondistended   Neurologic: Alert, Cranial Nerves grossly intact, speech clear   Skin: Extremities warm, no rashes or wounds    Result Review    Result Review:  I have personally reviewed the following over the last 24 hours (07:00 to 07:00) and agree with the following findings  [x]  Laboratory  CBC          2023    09:24 2023    12:03 2023    02:21   CBC   WBC 5.76  12.83  4.43    RBC 4.67  4.68  3.82    Hemoglobin 15.0  14.8  12.5    Hematocrit 44.0  44.6  36.5    MCV 94.2  95.3  95.5    MCH 32.1  31.6  32.7    MCHC 34.1  33.2  34.2    RDW 12.0  12.3  12.7    Platelets 338  269  226      BMP          2023    09:24 2023    12:03 2023    02:21   BMP   BUN 7  13  13    Creatinine 1.08  1.37  1.22    Sodium 139  136  136    Potassium 4.5  4.5  3.7    Chloride 103  101  107    CO2 21.7  22.9  20.2    Calcium 9.4  9.7  8.6      Lactate 1.1    [x]   Microbiology  Blood cultures no growth x1 day    Urine culture    50,000 CFU/mL Mixed Eduar Isolated   Specimen contains mixed organisms of questionable pathogenicity suggestive of contamination. If symptoms persist, suggest recollection.  Colonization of the urinary tract without infection is common. Treatment is discouraged unless the patient is symptomatic, pregnant, or undergoing an invasive urologic procedure.        []  Radiology   []  EKG/Telemetry   []  Cardiology/Vascular   []  Pathology  []  Old records  []  Other:    Assessment & Plan   Assessment / Plan     Assessment/Plan:  Ureterolithiasis  Left-sided hydronephrosis  Elevated lactate, resolved  Renal insufficiency  Mild Leukocytosis, resolved  History of hypothyrodism  Anxiety and depression       Appreciate urology assistance, patient to go for ureteroscopy with stent placement  Continue IV fluids for now.  Can stop after the procedure and start clear liquids and advance as tolerated  Afebrile.  White count normalized.  Urine culture mixed eduar.  Will complete 3-day total course of Rocephin  Creatinine improved not far from baseline.  Nonoliguric.  Monitor and avoid NSAIDs  Continue Percocet 7.5 mg every 6 hours prn moderate pain and morphine 4 mg every 3 hours prn severe pain  Restart Synthroid  Restart home Abilify, Wellbutrin, Cymbalta  Subcutaneous heparin for DVT prophylaxis  Ambulate    Likely home on 9/8 with urology follow-up      Discussed plan with RN.    DVT prophylaxis:  Mechanical DVT prophylaxis orders are present.    CODE STATUS:   Code Status (Patient has no pulse and is not breathing): CPR (Attempt to Resuscitate)  Medical Interventions (Patient has pulse or is breathing): Full Support        Electronically signed by Migel Celis DO, 09/07/23, 3:33 PM EDT.

## 2023-09-07 NOTE — ANESTHESIA PREPROCEDURE EVALUATION
Anesthesia Evaluation     Patient summary reviewed and Nursing notes reviewed   no history of anesthetic complications:   NPO Solid Status: > 8 hours  NPO Liquid Status: > 2 hours           Airway   Mallampati: III  TM distance: >3 FB  Neck ROM: full  Large neck circumference and Possible difficult intubation  Dental      Pulmonary - normal exam    breath sounds clear to auscultation  (+) ,sleep apnea  Cardiovascular - normal exam  Exercise tolerance: poor (<4 METS)    Rhythm: regular  Rate: normal    (+) hyperlipidemia      Neuro/Psych  (+) headaches, psychiatric history Depression  GI/Hepatic/Renal/Endo    (+) obesity, morbid obesity, renal disease stones, thyroid problem hypothyroidism    Musculoskeletal     Abdominal    Substance History - negative use     OB/GYN negative ob/gyn ROS         Other   arthritis,     ROS/Med Hx Other: PAT Nursing Notes unavailable.                 Anesthesia Plan    ASA 3     general     (Patient understands anesthesia not responsible for dental damage.  Nauseated preop given zofran, dry heaves)  intravenous induction     Anesthetic plan, risks, benefits, and alternatives have been provided, discussed and informed consent has been obtained with: patient.  Pre-procedure education provided  Plan discussed with CRNA.    CODE STATUS:    Code Status (Patient has no pulse and is not breathing): CPR (Attempt to Resuscitate)  Medical Interventions (Patient has pulse or is breathing): Full Support

## 2023-09-07 NOTE — OP NOTE
URETEROSCOPY LASER LITHOTRIPSY WITH STENT INSERTION  Procedure Report    Patient Name:  Cher Quezada  YOB: 1965    Date of Surgery:  9/7/2023     Pre-op Diagnosis:   Ureterolithiasis [N20.1]       Post-Op Diagnosis Codes:     * Ureterolithiasis [N20.1]      Procedure/CPT® Codes:    Procedure(s):  LEFT URETEROSCOPY, LASER LITHOTRIPSY, STONE BASKET EXTRACTION WITH STENT INSERTION      Staff:  Surgeon(s):  Ernestine Navarrete MD         Anesthesia: General    Estimated Blood Loss: none    Implants:    Implant Name Type Inv. Item Serial No.  Lot No. LRB No. Used Action   STNT URETRL ASCERTA 6F 24CM - [id number] Stent STNT URETRL ASCERTA 6F 24CM  [employer] [id number] Left 1 Implanted       Specimen:          Specimens       ID Source Type Tests Collected By Collected At Frozen?    A Kidney, Left Calculus TISSUE PATHOLOGY EXAM  STONE ANALYSIS   Ernestine Navarrete MD 9/7/23 1033     Description: LEFT URETERAL STONE                Complications: None    Description of Procedure:     After proper consent was obtained, patient was taken to operating room and placed in the dorsal lithotomy position.  The patient was prepped and draped in the normal sterile fashion for a left ureteroscopy.      A 22 Tajik rigid cystoscopy was passed per urethra into the bladder.  The bladder was inspected in a systemic meridian fashion.  No stones, tumors or other abnormalities were seen.      A glide wire was passed up the left ureteral orifice without difficulty.  A dual lumen catheter was placed and a second wire was placed as a safety wire.  Over the working wire a ureteral access sheath was passed.  A flexible scope was then passed into the ureter.  There was a stone encountered in the proximal left ureter .  There were no other stones seen.     A 270 laser fiber was used to break the stone up into several small pieces.  A stone basket was placed into the ureter and the stones were grasped and  removed.      There was no evidence of injury to the ureter other than a small rent about 3 mm in width and 1 cm in length in the proximal ureter from the access sheath.  The ureteroscope was removed.  Over the wire, a 6 [place] Islander 24 cm stent was passed.  Under fluoroscopy it was seen curling in the renal pelvis and under cystoscopy was seen curling in the bladder.  The cystoscope was removed.      A string was NOT left on the stent.      The patient tolerated the procedure well and was transferred to the PACU in stable condition.        Ernestine Navarrete MD     Date: 9/7/2023  Time: 10:59 EDT

## 2023-09-08 ENCOUNTER — READMISSION MANAGEMENT (OUTPATIENT)
Dept: CALL CENTER | Facility: HOSPITAL | Age: 58
End: 2023-09-08
Payer: COMMERCIAL

## 2023-09-08 VITALS
HEIGHT: 63 IN | WEIGHT: 293 LBS | DIASTOLIC BLOOD PRESSURE: 72 MMHG | BODY MASS INDEX: 51.91 KG/M2 | OXYGEN SATURATION: 96 % | SYSTOLIC BLOOD PRESSURE: 142 MMHG | TEMPERATURE: 97.8 F | RESPIRATION RATE: 18 BRPM | HEART RATE: 73 BPM

## 2023-09-08 LAB
ANION GAP SERPL CALCULATED.3IONS-SCNC: 10.3 MMOL/L (ref 5–15)
BUN SERPL-MCNC: 7 MG/DL (ref 6–20)
BUN/CREAT SERPL: 7.5 (ref 7–25)
CALCIUM SPEC-SCNC: 9.4 MG/DL (ref 8.6–10.5)
CHLORIDE SERPL-SCNC: 107 MMOL/L (ref 98–107)
CO2 SERPL-SCNC: 22.7 MMOL/L (ref 22–29)
CREAT SERPL-MCNC: 0.93 MG/DL (ref 0.57–1)
DEPRECATED RDW RBC AUTO: 44.4 FL (ref 37–54)
EGFRCR SERPLBLD CKD-EPI 2021: 72.3 ML/MIN/1.73
ERYTHROCYTE [DISTWIDTH] IN BLOOD BY AUTOMATED COUNT: 12.5 % (ref 12.3–15.4)
GLUCOSE SERPL-MCNC: 145 MG/DL (ref 65–99)
HCT VFR BLD AUTO: 42.8 % (ref 34–46.6)
HGB BLD-MCNC: 14.5 G/DL (ref 12–15.9)
MCH RBC QN AUTO: 32.6 PG (ref 26.6–33)
MCHC RBC AUTO-ENTMCNC: 33.9 G/DL (ref 31.5–35.7)
MCV RBC AUTO: 96.2 FL (ref 79–97)
PLATELET # BLD AUTO: 295 10*3/MM3 (ref 140–450)
PMV BLD AUTO: 10.1 FL (ref 6–12)
POTASSIUM SERPL-SCNC: 4.2 MMOL/L (ref 3.5–5.2)
RBC # BLD AUTO: 4.45 10*6/MM3 (ref 3.77–5.28)
SODIUM SERPL-SCNC: 140 MMOL/L (ref 136–145)
WBC NRBC COR # BLD: 9.14 10*3/MM3 (ref 3.4–10.8)

## 2023-09-08 PROCEDURE — 85027 COMPLETE CBC AUTOMATED: CPT | Performed by: UROLOGY

## 2023-09-08 PROCEDURE — 99239 HOSP IP/OBS DSCHRG MGMT >30: CPT | Performed by: INTERNAL MEDICINE

## 2023-09-08 PROCEDURE — 96372 THER/PROPH/DIAG INJ SC/IM: CPT

## 2023-09-08 PROCEDURE — 25010000002 HEPARIN (PORCINE) PER 1000 UNITS: Performed by: INTERNAL MEDICINE

## 2023-09-08 PROCEDURE — G0378 HOSPITAL OBSERVATION PER HR: HCPCS

## 2023-09-08 PROCEDURE — 80048 BASIC METABOLIC PNL TOTAL CA: CPT | Performed by: UROLOGY

## 2023-09-08 PROCEDURE — 25010000002 CEFTRIAXONE PER 250 MG: Performed by: UROLOGY

## 2023-09-08 RX ADMIN — OXYCODONE HYDROCHLORIDE AND ACETAMINOPHEN 1 TABLET: 7.5; 325 TABLET ORAL at 03:22

## 2023-09-08 RX ADMIN — BUPROPION HYDROCHLORIDE 150 MG: 150 TABLET, EXTENDED RELEASE ORAL at 08:42

## 2023-09-08 RX ADMIN — HEPARIN SODIUM 5000 UNITS: 5000 INJECTION INTRAVENOUS; SUBCUTANEOUS at 06:25

## 2023-09-08 RX ADMIN — Medication 10 ML: at 08:42

## 2023-09-08 RX ADMIN — LEVOTHYROXINE SODIUM 75 MCG: 75 TABLET ORAL at 06:25

## 2023-09-08 RX ADMIN — CEFTRIAXONE SODIUM 1000 MG: 1 INJECTION, SOLUTION INTRAVENOUS at 08:42

## 2023-09-08 RX ADMIN — OXYCODONE HYDROCHLORIDE AND ACETAMINOPHEN 1 TABLET: 7.5; 325 TABLET ORAL at 08:52

## 2023-09-08 RX ADMIN — ARIPIPRAZOLE 10 MG: 10 TABLET ORAL at 08:42

## 2023-09-08 RX ADMIN — DULOXETINE HYDROCHLORIDE 60 MG: 30 CAPSULE, DELAYED RELEASE ORAL at 08:42

## 2023-09-08 NOTE — DISCHARGE SUMMARY
[facility]         HOSPITALIST  DISCHARGE SUMMARY    Patient Name: Cher Quezada  : 1965  MRN: [id number]    Date of Admission: 2023  Date of Discharge:  23  Primary Care Physician: Theresa Chacon APRN    Consults       No orders found from 2023 to 2023.            Active and Resolved Hospital Problems:  Ureterolithiasis  Left-sided hydronephrosis  Elevated lactate, resolved  Renal insufficiency  Mild Leukocytosis, resolved  History of hypothyrodism  Anxiety and depression    Hospital Course     Hospital Course:  Cher Quezada is a 58 y.o. female with morbid obesity, hypothyroidism, HLD, anxiety depression who presented with left-sided flank pain associated with nausea.  No dysuria, suprapubic pain or hematuria.  No fever or chills. On presentation afebrile, pulse 105, respiratory rate 18, blood pressure 144/92, SPO2 100% on room air. WBC 12.83.  Lactate 2.9.  Creatinine 1.37 baseline of about 1.  Received IV fluids and antibiotics.  Lactate normalized.  Urology consulted.  Underwent left-sided ureteroscopy with laser lithotripsy and stent insertion without complication.  Uncomplicated postoperative course.  Vitals and labs remained stable.  Blood culture no growth urine culture mixed eduar.  Completed short course of antibiotics.  Tolerated oral intake and ambulated independently.  Medically appropriate for discharge, discussed need to follow-up with urology in a couple weeks for stent removal which urologist office will arrange.  Discharged home in stable condition    Day of Discharge     Vital Signs:  Temp:  [97.7 °F (36.5 °C)-98.3 °F (36.8 °C)] 97.8 °F (36.6 °C)  Heart Rate:  [73-85] 73  Resp:  [18] 18  BP: (133-160)/(67-74) 142/72  Physical Exam:   GENERAL: The patient is conversant and nontoxic.  HEENT: PERRLA, Oropharynx clear. Moist mucous membranes.   HEART: Regular rate and rhythm. No edema  LUNGS: Equal air entry, clear to auscultation  bilaterally.  ABDOMEN: Soft, positive bowel sounds, nontender, nondistended  SKIN: No rash or open wounds   NEUROLOGIC: Alert, cranial nerves grossly intact, speech clear    Discharge Details        Discharge Medications        Continue These Medications        Instructions Start Date   ARIPiprazole 10 MG tablet  Commonly known as: ABILIFY   10 mg, Oral, Daily      buPROPion  MG 24 hr tablet  Commonly known as: WELLBUTRIN XL   150 mg, Oral, Daily      Cholecalciferol 25 MCG (1000 UT) capsule   1,000 Units, Oral, Daily      Cyanocobalamin ER 2000 MCG tablet controlled-release   2,000 mcg, Oral, Daily      DULoxetine 60 MG capsule  Commonly known as: CYMBALTA   60 mg, Oral, Daily      levothyroxine 75 MCG tablet  Commonly known as: Synthroid   75 mcg, Oral, Daily      loratadine 10 MG tablet  Commonly known as: CLARITIN   1 tablet, Oral, Daily      rosuvastatin 5 MG tablet  Commonly known as: Crestor   5 mg, Oral, Daily      Zinc Sulfate 220 (50 Zn) MG tablet   50 mg, Oral, Daily               Allergies   Allergen Reactions    Amoxicillin-Pot Clavulanate Rash    Nsaids GI Intolerance    Sumatriptan Hallucinations       Discharge Disposition:  Home or Self Care    Diet:  Hospital:No active diet order      Discharge Activity:   Activity Instructions       Activity as Tolerated              CODE STATUS:  Code Status and Medical Interventions:   Ordered at: 09/06/23 1527     Code Status (Patient has no pulse and is not breathing):    CPR (Attempt to Resuscitate)     Medical Interventions (Patient has pulse or is breathing):    Full Support         Future Appointments   Date Time Provider Department Center   9/20/2023 10:00 AM Ernestine Navarrete MD [facility]       Additional Instructions for the Follow-ups that You Need to Schedule       Discharge Follow-up with PCP   As directed       Currently Documented PCP:    Theresa Chacon APRN    PCP Phone Number:    [phone]     Follow Up Details: as needed         Discharge Follow-up with Specified Provider: Dr Navarrete; 3 Weeks   As directed      To: Dr Navarrete   Follow Up: 3 Weeks                Pertinent  and/or Most Recent Results     PROCEDURES:     LEFT URETEROSCOPY, LASER LITHOTRIPSY, STONE BASKET EXTRACTION WITH STENT INSERTION          LAB RESULTS:      Lab 09/08/23 0311 09/07/23 0221 09/06/23 2013 09/06/23  1727 09/06/23  1425 09/06/23  1203   WBC 9.14 4.43  --   --   --  12.83*   HEMOGLOBIN 14.5 12.5  --   --   --  14.8   HEMATOCRIT 42.8 36.5  --   --   --  44.6   PLATELETS 295 226  --   --   --  269   NEUTROS ABS  --   --   --   --   --  12.00*   IMMATURE GRANS (ABS)  --   --   --   --   --  0.05   LYMPHS ABS  --   --   --   --   --  0.29*   MONOS ABS  --   --   --   --   --  0.46   EOS ABS  --   --   --   --   --  0.00   MCV 96.2 95.5  --   --   --  95.3   LACTATE  --  1.1 3.0* 3.4* 2.1* 2.9*         Lab 09/08/23 0311 09/07/23 0221 09/06/23  1203   SODIUM 140 136 136   POTASSIUM 4.2 3.7 4.5   CHLORIDE 107 107 101   CO2 22.7 20.2* 22.9   ANION GAP 10.3 8.8 12.1   BUN 7 13 13   CREATININE 0.93 1.22* 1.37*   EGFR 72.3 52.2* 45.4*   GLUCOSE 145* 139* 145*   CALCIUM 9.4 8.6 9.7         Lab 09/06/23  1203   TOTAL PROTEIN 7.5   ALBUMIN 4.1   GLOBULIN 3.4   ALT (SGPT) 31   AST (SGOT) 21   BILIRUBIN 0.5   ALK PHOS 135*   LIPASE 29                     Brief Urine Lab Results  (Last result in the past 365 days)        Color   Clarity   Blood   Leuk Est   Nitrite   Protein   CREAT   Urine HCG        09/06/23 1244 Yellow   Cloudy   Moderate (2+)   Trace   Negative   Trace                 Microbiology Results (last 10 days)       Procedure Component Value - Date/Time    Blood Culture - Blood, Arm, Left [[id number]]  (Normal) Collected: 09/06/23 1425    Lab Status: Preliminary result Specimen: Blood from Arm, Left Updated: 09/07/23 1645     Blood Culture No growth at 24 hours    Blood Culture - Blood, Arm, Left [[id number]]  (Normal) Collected: 09/06/23 1425    Lab  Status: Preliminary result Specimen: Blood from Arm, Left Updated: 09/08/23 1430     Blood Culture No growth at 2 days    Urine Culture - Urine, Urine, Clean Catch [[id number]] Collected: 09/06/23 1244    Lab Status: Final result Specimen: Urine, Clean Catch Updated: 09/07/23 1131     Urine Culture 50,000 CFU/mL Mixed Eliana Isolated    Narrative:      Specimen contains mixed organisms of questionable pathogenicity suggestive of contamination. If symptoms persist, suggest recollection.  Colonization of the urinary tract without infection is common. Treatment is discouraged unless the patient is symptomatic, pregnant, or undergoing an invasive urologic procedure.            No radiology results for the last 7 days                 Labs Pending at Discharge:  Pending Labs       Order Current Status    STONE ANALYSIS - Calculus, Kidney, Left In process    Tissue Pathology Exam In process    Blood Culture - Blood, Arm, Left Preliminary result    Blood Culture - Blood, Arm, Left Preliminary result            Time spent on Discharge including face to face service:>30 minutes    Electronically signed by Migel Celis DO, 09/08/23, 4:16 PM EDT.

## 2023-09-09 ENCOUNTER — TRANSITIONAL CARE MANAGEMENT TELEPHONE ENCOUNTER (OUTPATIENT)
Dept: CALL CENTER | Facility: HOSPITAL | Age: 58
End: 2023-09-09
Payer: COMMERCIAL

## 2023-09-09 NOTE — OUTREACH NOTE
Call Center TCM Note      Flowsheet Row Responses   [facility] patient discharged from? [facility]   Does the patient have one of the following disease processes/diagnoses(primary or secondary)? Other   TCM attempt successful? Yes   Call start time 1209   Call Status Left message   Call end time 1211   Discharge diagnosis URETEROSCOPY LASER LITHOTRIPSY WITH STENT INSERTION   Person spoke with today (if not patient) and relationship spouse   Meds reviewed with patient/caregiver? Yes   Is the patient having any side effects they believe may be caused by any medication additions or changes? No   Does the patient have all medications ordered at discharge? N/A   Is the patient taking all medications as directed (includes completed medication regime)? Yes   Does the patient have an appointment with their PCP within 7-14 days of discharge? No   Nursing Interventions Patient declined scheduling/rescheduling appointment at this time   Has home health visited the patient within 72 hours of discharge? N/A   Psychosocial issues? No   Did the patient receive a copy of their discharge instructions? Yes   Nursing interventions Reviewed instructions with patient   What is the patient's perception of their health status since discharge? Improving   Is the patient/caregiver able to teach back the hierarchy of who to call/visit for symptoms/problems? PCP, Specialist, Home health nurse, Urgent Care, ED, 911 Yes   TCM call completed? Yes   Call end time 1211   Would this patient benefit from a Referral to Amb Social Work? No   Is the patient interested in additional calls from an ambulatory ? No            Cher Jean RN    9/9/2023, 12:11 EDT

## 2023-09-09 NOTE — OUTREACH NOTE
Prep Survey      Flowsheet Row Responses   [facility] patient discharged from? [facility]   Is LACE score < 7 ? Yes   Eligibility [facility]   Date of Admission 09/06/23   Date of Discharge 09/08/23   Discharge Disposition Home or Self Care   Discharge diagnosis URETEROSCOPY LASER LITHOTRIPSY WITH STENT INSERTION   Does the patient have one of the following disease processes/diagnoses(primary or secondary)? Other   Does the patient have Home health ordered? No   Is there a DME ordered? No   Prep survey completed? Yes            Chantelle FLORES - Registered Nurse

## 2023-09-11 LAB
BACTERIA SPEC AEROBE CULT: NORMAL
BACTERIA SPEC AEROBE CULT: NORMAL
LAB AP CASE REPORT: NORMAL
LAB AP CLINICAL INFORMATION: NORMAL
PATH REPORT.FINAL DX SPEC: NORMAL
PATH REPORT.GROSS SPEC: NORMAL

## 2023-09-20 ENCOUNTER — PROCEDURE VISIT (OUTPATIENT)
Dept: UROLOGY | Facility: CLINIC | Age: 58
End: 2023-09-20
Payer: COMMERCIAL

## 2023-09-20 VITALS — WEIGHT: 293 LBS | HEIGHT: 63 IN | BODY MASS INDEX: 51.91 KG/M2

## 2023-09-20 DIAGNOSIS — N20.0 KIDNEY STONES: Primary | ICD-10-CM

## 2023-09-20 LAB
BILIRUB BLD-MCNC: NEGATIVE MG/DL
CALCIUM OXALATE DIHYDRATE MFR STONE IR: 30 %
CLARITY, POC: CLEAR
COLOR STONE: NORMAL
COLOR UR: YELLOW
COM MFR STONE: 70 %
COMPN STONE: NORMAL
EXPIRATION DATE: ABNORMAL
GLUCOSE UR STRIP-MCNC: NEGATIVE MG/DL
KETONES UR QL: NEGATIVE
LABORATORY COMMENT REPORT: NORMAL
LEUKOCYTE EST, POC: ABNORMAL
Lab: ABNORMAL
Lab: NORMAL
Lab: NORMAL
NITRITE UR-MCNC: NEGATIVE MG/ML
PH UR: 5 [PH] (ref 5–8)
PHOTO: NORMAL
PROT UR STRIP-MCNC: ABNORMAL MG/DL
RBC # UR STRIP: ABNORMAL /UL
SIZE STONE: NORMAL MM
SP GR UR: 1.02 (ref 1–1.03)
SPEC SOURCE SUBJ: NORMAL
UROBILINOGEN UR QL: ABNORMAL
WT STONE: 69 MG

## 2023-09-20 NOTE — PROGRESS NOTES
Stent Removal    Date/Time: 9/20/2023 10:58 AM  Performed by: Ernestine Navarrete MD  Authorized by: Ernestine Navarrete MD   Preparation: Patient was prepped and draped in the usual sterile fashion.  Local anesthesia used: no    Anesthesia:  Local anesthesia used: no    Sedation:  Patient sedated: no    Patient tolerance: patient tolerated the procedure well with no immediate complications  Comments: After proper consent was obtained patient was placed into the dorsal lithotomy position.  Flexible cystoscope was passed into the bladder.  The ureteral stent was grasped and removed.  The cystoscope was then removed.  Patient tolerated the procedure well.

## 2023-10-23 ENCOUNTER — TRANSCRIBE ORDERS (OUTPATIENT)
Dept: ADMINISTRATIVE | Facility: HOSPITAL | Age: 58
End: 2023-10-23
Payer: COMMERCIAL

## 2023-10-23 DIAGNOSIS — Z12.31 VISIT FOR SCREENING MAMMOGRAM: Primary | ICD-10-CM

## 2023-10-27 ENCOUNTER — OFFICE VISIT (OUTPATIENT)
Dept: SLEEP MEDICINE | Facility: HOSPITAL | Age: 58
End: 2023-10-27
Payer: COMMERCIAL

## 2023-10-27 VITALS
WEIGHT: 293 LBS | HEART RATE: 77 BPM | DIASTOLIC BLOOD PRESSURE: 71 MMHG | SYSTOLIC BLOOD PRESSURE: 149 MMHG | OXYGEN SATURATION: 98 % | BODY MASS INDEX: 51.91 KG/M2 | HEIGHT: 63 IN

## 2023-10-27 DIAGNOSIS — R06.83 SNORING: ICD-10-CM

## 2023-10-27 DIAGNOSIS — F32.A DEPRESSION, UNSPECIFIED DEPRESSION TYPE: ICD-10-CM

## 2023-10-27 DIAGNOSIS — R29.818 SUSPECTED SLEEP APNEA: Primary | ICD-10-CM

## 2023-10-27 DIAGNOSIS — G47.19 EXCESSIVE DAYTIME SLEEPINESS: ICD-10-CM

## 2023-10-27 DIAGNOSIS — E66.01 CLASS 3 SEVERE OBESITY WITH BODY MASS INDEX (BMI) OF 50.0 TO 59.9 IN ADULT, UNSPECIFIED OBESITY TYPE, UNSPECIFIED WHETHER SERIOUS COMORBIDITY PRESENT: ICD-10-CM

## 2023-10-27 PROCEDURE — G0463 HOSPITAL OUTPT CLINIC VISIT: HCPCS

## 2023-10-27 NOTE — PROGRESS NOTES
[facility]  [address] [place] [address]  [place]   [place] [address]  Phone: [phone]  Fax: [phone]      Cher Quezada  [id number]   1965  58 y.o.  female      Referring physician/provider and PCP Theresa Chacon APRN    Type of service: Initial Sleep Medicine Consult.  Date of service: 10/27/2023      Chief Complaint   Patient presents with    Snoring       History of present illness;  Thank you for asking to see Cher Quezada, 58 y.o. PMHx of Depression, Hypothyroidism, Chronic back pain, Arthritis. The patient was seen today on 10/27/2023 at [facility].  The patient presents today with symptoms of snoring, non-restorative sleep and witnessed apneas. The symptoms are present for >1 year and they are persistent in nature.  The snoring is present in all positions and it is loud.  Patient  denies prior surgery namely tonsillectomy, nasal surgery or UPPP.       -States without CPAP she has loud snoring, witnessed apneas, gasping for air in sleep, excessive daytime sleepiness  -In the past she only had a diagnostic sleep study >10 YEARS AGO   On that diagnostic (reviewed by me below severe sleep apnea and sleep related hypoxia)  Denies having past PAP titration     -10 year old CPAP states recently broke beyond repair   States message also popped up stating motor has reached it's limit     Obstructive Sleep Apnea Screening: STOP-BANG Sleep Apnea Questionnaire. Reference: Medardo F et al. Br J Anaesth, 2012.     Criterion    Yes    No  Do you SNORE loudly?   [x]   Yes  []   No   Do you often feel TIRED, fatigued, or sleepy during the day?    [x]   Yes  []   No  Has anyone OBSERVED you stop breathing during your sleep?    [x]   Yes  []   No  Do you have or are you being treated for high blood PRESSURE?    []   Yes  [x]   No  BMI >32 kg/m2     [x]   Yes  []   No  AGE > 50 years    [x]   Yes  []   No  NECK circumference >16 inches / 40 cm    []   Yes  [x]    No  GENDER: male     []   Yes  [x]   No    DAVID Probability:  []   1-2 - Low  []   3-4 - Intermediate  [x]   5-8 - High      Further Sleep History:    Bedtime: 9pm to 10pm   Rise Time: 8 am   Sleep Latency: less than 20 minutes  Screens in bed: yes  Wake after sleep onset: 2x  Reasons for awakenings: nocturia  Number of naps per day denies  Naps restorative: n/a  Caffeine use: 4 beverages throughout the day    RLS Symptoms: No   Bruxism:No   Current sleep related gastroesophageal reflux symptoms:  No   Cataplexy:  No   Sleep Paralysis:  No   Hypnagogic or hypnopompic hallucinations: No   Parasomnias such as sleep walking or sleep eating No     Disclaimer Sleep History: The above sleep history is based on this sleep physician's in room encounter with the patient. Pre encounter self administered questionnaires are taken into consideration and discussed with patient for any discordance. The above documentation by this sleep physician is the most accurate clinical information determined by in room sleep physician encounter with patient.     MEDICAL CONDITIONS (PMH)   Depression  Hypothyroidism  Chronic back pain  Arthritis     Social history:  Do you drive a commercial vehicle:  No   Shift work:  No   Tobacco use:  No   Alcohol use: 0 per week      Family Hx (parents and siblings) (pertaining to sleep medicine)  Sleep apnea  Thyroid disorder    Medications: reviewed    Review of systems is negative unless otherwise noted per HPI   Disclaimer History: The above history is based on this sleep physician's in room encounter with the patient. Pre encounter self administered questionnaires are taken into consideration and discussed with patient for any discordance. The above documentation by this sleep physician is the most accurate clinical information determined by in room sleep physician encounter with patient.     Physical exam:  Vitals:    10/27/23 1300   BP: 149/71   Pulse: 77   SpO2: 98%   Weight: (!) 138 kg (304 lb 6.4  "oz)   Height: 160 cm (63\")    Body mass index is 53.92 kg/m².   CONSTITUTIONAL:  Non-toxic, In no overt distress   Head: normocephalic   ENT: Mallampati class IV, + macroglossia, no septal defects   NECK:Neck Circumference: 15.5 inches,no nuchal rigidity  RESPIRATORY SYSTEM: Breath sounds are clear (no rales, no rhonchi, no wheezes), no accessory muscle use  CARDIOVASULAR SYSTEM: Heart sounds are regular rhythm and normal rate, no rub, no gallop, no edema  NEUROLOGICAL SYSTEM: Oriented x 3, No gross focal deficits   PSYCHIATRIC SYSTEM: Goal oriented, affect full range appropriate      Office notes from care team reviewed:      -7/10/23 Office Visit PCP DESMOND Chacon     Labs reviewed.  TSH          2/6/2023    12:55 8/2/2023    09:24   TSH   TSH 3.180  3.500       Most Recent A1C          8/2/2023    09:24   HGBA1C Most Recent   Hemoglobin A1C 6.40    9/8/2023  Bicarb 22.7  H&H 14.5/42.8  MCV 96.2    Imaging/Diagnostics reviewed:   [facility]  Testing 7/7/2005  Polysomnography  RDI 32.7  O2 bari 68  Oximetry minutes less than 88% 33 minutes  Diagnosis by interpreting physician  Obstructive sleep apnea  Recommendation CPAP titration  Interpreting physician Dr. Xenia Quintana      Assessment and plan:  Suspected sleep apnea [R29.818] patient's symptoms and examination is consistent with sleep apnea (G47.30). I have talked to the patient about the signs and symptoms of sleep apnea. In addition, I have also discussed pathophysiology of sleep apnea.  I also discussed the complications of untreated sleep apnea including effects on hypertension, diabetes mellitus and nonrestorative sleep with hypersomnia which can increase risk for motor vehicle accidents.  Untreated sleep apnea is also a risk factor for development of atrial fibrillation, hypertension, insulin resistance and cerebrovascular accident.  Discussed in detail of various testing methods including home-based and lab " based sleep studies.  Based on history and physical examination and other comorbidities the most appropriate study is Home sleep study.  The order for the sleep study is placed in [facility].  The test will be scheduled after approval from insurance. Treatment and management will be discussed after the test is completed. High pretest probability STOP-BANG 5/8, macroglossia, Mallampati is IV.  Home sleep study may rule in sleep apnea.  However, under patient's specific clinical circumstances home sleep study may not rule out sleep apnea.  If home sleep testing is negative must proceed with in laboratory polysomnography to definitively rule out sleep apnea (the prior was discussed with patient). Patient was given opportunity to ask questions and all the questions were answered.   Snoring (R06.83), snoring is the sound created by turbulent airflow vibrating upper airway soft tissue due to limitation of inspiratory airflow. I have also discussed factors affecting snoring including sleep deprivation, sleeping on the back and alcohol ingestion. To minimize snoring, patient is advised to have adequate sleep, sleep on the side and avoid alcohol and sedative medications before bedtime  Excessive daytime sleepiness .  Patient endorses subjective excessive daytime sleepiness with sleep physician encounter which was consistent with patient's pre-encounter self-administered [place] Sleepiness Scale of Total score: 10.  There are many causes for daytime excessive sleepiness including sleep depression, shiftwork syndrome, depression and other medical disorders including heart, kidney and liver failure.  The most serious cause of excessive sleepiness is due to neurological conditions like narcolepsy/cataplexy.  But the most common cause of excessive sleepiness is due to sleep apnea with frequent awakenings during sleep time.  I have discussed safety of driving and to remain vigilant while driving.  Obesity, patient's BMI is Body mass  index is 53.92 kg/m².. I have discussed the relationship between weight and sleep apnea.There is direct correlation between weight and severity of sleep apnea.  Weight reduction is encouraged, as it is going to reduce the severity of sleep apnea. I have also discussed with the patient diet and exercise to achieve ideal body weight.  Depression,  Follow up with primary care physician for continued management. This medical condition would make the patient eligible for a trial of PAP therapy even if sleep study reveals mild severity sleep apnea.      I have also discussed with the patient the following  Sleep hygiene: Maintaining a regular bedtime and wake time, not to watch television or work in bed, limit caffeine-containing beverages before bed time and avoid naps during the day  Adequate amount of sleep.  Generally most people needs about 7 to 8 hours of sleep.      Return for 31 to 90 days after PAP setup with down load.  Patient's questions were answered      I once again thank you for asking me to see this patient in consultation and I have forwarded my opinion and treatment plan.  Please do not hesitate to call me if you have any questions.       EMR Dragon/Transcription disclaimer:   Much of this encounter note is an electronic transcription/translation of spoken language to printed text. The electronic translation of spoken language may permit erroneous, or at times, nonsensical words or phrases to be inadvertently transcribed; Although I have reviewed the note for such errors, some may still exist.     NPI #: [id number]    Veronica Scales, DO  Sleep Medicine  [facility]  10/27/23

## 2023-11-07 ENCOUNTER — HOSPITAL ENCOUNTER (OUTPATIENT)
Dept: SLEEP MEDICINE | Facility: HOSPITAL | Age: 58
Discharge: HOME | End: 2023-11-07
Admitting: FAMILY MEDICINE
Payer: COMMERCIAL

## 2023-11-07 DIAGNOSIS — R29.818 SUSPECTED SLEEP APNEA: ICD-10-CM

## 2023-11-07 DIAGNOSIS — R06.83 SNORING: ICD-10-CM

## 2023-11-07 DIAGNOSIS — G47.19 EXCESSIVE DAYTIME SLEEPINESS: ICD-10-CM

## 2023-11-07 PROCEDURE — 95806 SLEEP STUDY UNATT&RESP EFFT: CPT

## 2023-11-08 DIAGNOSIS — G47.33 OBSTRUCTIVE SLEEP APNEA, ADULT: Primary | ICD-10-CM

## 2023-11-10 ENCOUNTER — TELEPHONE (OUTPATIENT)
Dept: SLEEP MEDICINE | Facility: HOSPITAL | Age: 58
End: 2023-11-10
Payer: COMMERCIAL

## 2023-11-30 ENCOUNTER — CLINICAL SUPPORT (OUTPATIENT)
Dept: FAMILY MEDICINE CLINIC | Facility: CLINIC | Age: 58
End: 2023-11-30
Payer: COMMERCIAL

## 2023-11-30 DIAGNOSIS — Z23 NEED FOR INFLUENZA VACCINATION: Primary | ICD-10-CM

## 2024-01-24 ENCOUNTER — LAB (OUTPATIENT)
Dept: LAB | Facility: HOSPITAL | Age: 59
End: 2024-01-24
Payer: COMMERCIAL

## 2024-01-24 ENCOUNTER — OFFICE VISIT (OUTPATIENT)
Dept: FAMILY MEDICINE CLINIC | Facility: CLINIC | Age: 59
End: 2024-01-24
Payer: COMMERCIAL

## 2024-01-24 VITALS
BODY MASS INDEX: 51.91 KG/M2 | WEIGHT: 293 LBS | HEIGHT: 63 IN | HEART RATE: 88 BPM | SYSTOLIC BLOOD PRESSURE: 134 MMHG | DIASTOLIC BLOOD PRESSURE: 76 MMHG | OXYGEN SATURATION: 97 %

## 2024-01-24 DIAGNOSIS — E78.5 HYPERLIPIDEMIA, UNSPECIFIED HYPERLIPIDEMIA TYPE: ICD-10-CM

## 2024-01-24 DIAGNOSIS — F33.0 MAJOR DEPRESSIVE DISORDER, RECURRENT, MILD: ICD-10-CM

## 2024-01-24 DIAGNOSIS — R73.03 PREDIABETES: ICD-10-CM

## 2024-01-24 DIAGNOSIS — E03.9 HYPOTHYROIDISM, UNSPECIFIED TYPE: ICD-10-CM

## 2024-01-24 LAB
ALBUMIN SERPL-MCNC: 4 G/DL (ref 3.5–5.2)
ALBUMIN/GLOB SERPL: 1.3 G/DL
ALP SERPL-CCNC: 150 U/L (ref 39–117)
ALT SERPL W P-5'-P-CCNC: 44 U/L (ref 1–33)
ANION GAP SERPL CALCULATED.3IONS-SCNC: 11.9 MMOL/L (ref 5–15)
AST SERPL-CCNC: 30 U/L (ref 1–32)
BASOPHILS # BLD AUTO: 0.03 10*3/MM3 (ref 0–0.2)
BASOPHILS NFR BLD AUTO: 0.5 % (ref 0–1.5)
BILIRUB SERPL-MCNC: 0.3 MG/DL (ref 0–1.2)
BUN SERPL-MCNC: 10 MG/DL (ref 6–20)
BUN/CREAT SERPL: 9.4 (ref 7–25)
CALCIUM SPEC-SCNC: 9.4 MG/DL (ref 8.6–10.5)
CHLORIDE SERPL-SCNC: 105 MMOL/L (ref 98–107)
CHOLEST SERPL-MCNC: 192 MG/DL (ref 0–200)
CO2 SERPL-SCNC: 21.1 MMOL/L (ref 22–29)
CREAT SERPL-MCNC: 1.06 MG/DL (ref 0.57–1)
DEPRECATED RDW RBC AUTO: 40.5 FL (ref 37–54)
EGFRCR SERPLBLD CKD-EPI 2021: 61.8 ML/MIN/1.73
EOSINOPHIL # BLD AUTO: 0.07 10*3/MM3 (ref 0–0.4)
EOSINOPHIL NFR BLD AUTO: 1.2 % (ref 0.3–6.2)
ERYTHROCYTE [DISTWIDTH] IN BLOOD BY AUTOMATED COUNT: 11.8 % (ref 12.3–15.4)
GLOBULIN UR ELPH-MCNC: 3.1 GM/DL
GLUCOSE SERPL-MCNC: 131 MG/DL (ref 65–99)
HCT VFR BLD AUTO: 43 % (ref 34–46.6)
HDLC SERPL-MCNC: 69 MG/DL (ref 40–60)
HGB BLD-MCNC: 14.7 G/DL (ref 12–15.9)
IMM GRANULOCYTES # BLD AUTO: 0.01 10*3/MM3 (ref 0–0.05)
IMM GRANULOCYTES NFR BLD AUTO: 0.2 % (ref 0–0.5)
LDLC SERPL CALC-MCNC: 102 MG/DL (ref 0–100)
LDLC/HDLC SERPL: 1.43 {RATIO}
LYMPHOCYTES # BLD AUTO: 1.68 10*3/MM3 (ref 0.7–3.1)
LYMPHOCYTES NFR BLD AUTO: 27.6 % (ref 19.6–45.3)
MCH RBC QN AUTO: 32.2 PG (ref 26.6–33)
MCHC RBC AUTO-ENTMCNC: 34.2 G/DL (ref 31.5–35.7)
MCV RBC AUTO: 94.1 FL (ref 79–97)
MONOCYTES # BLD AUTO: 0.43 10*3/MM3 (ref 0.1–0.9)
MONOCYTES NFR BLD AUTO: 7.1 % (ref 5–12)
NEUTROPHILS NFR BLD AUTO: 3.86 10*3/MM3 (ref 1.7–7)
NEUTROPHILS NFR BLD AUTO: 63.4 % (ref 42.7–76)
NRBC BLD AUTO-RTO: 0 /100 WBC (ref 0–0.2)
PLATELET # BLD AUTO: 322 10*3/MM3 (ref 140–450)
PMV BLD AUTO: 10.9 FL (ref 6–12)
POTASSIUM SERPL-SCNC: 4.6 MMOL/L (ref 3.5–5.2)
PROT SERPL-MCNC: 7.1 G/DL (ref 6–8.5)
RBC # BLD AUTO: 4.57 10*6/MM3 (ref 3.77–5.28)
SODIUM SERPL-SCNC: 138 MMOL/L (ref 136–145)
T4 FREE SERPL-MCNC: 1.06 NG/DL (ref 0.93–1.7)
TRIGL SERPL-MCNC: 122 MG/DL (ref 0–150)
TSH SERPL DL<=0.05 MIU/L-ACNC: 3.53 UIU/ML (ref 0.27–4.2)
VLDLC SERPL-MCNC: 21 MG/DL (ref 5–40)
WBC NRBC COR # BLD AUTO: 6.08 10*3/MM3 (ref 3.4–10.8)

## 2024-01-24 PROCEDURE — 84439 ASSAY OF FREE THYROXINE: CPT

## 2024-01-24 PROCEDURE — 80050 GENERAL HEALTH PANEL: CPT

## 2024-01-24 PROCEDURE — 83036 HEMOGLOBIN GLYCOSYLATED A1C: CPT

## 2024-01-24 PROCEDURE — 80061 LIPID PANEL: CPT

## 2024-01-24 PROCEDURE — 99214 OFFICE O/P EST MOD 30 MIN: CPT | Performed by: NURSE PRACTITIONER

## 2024-01-24 PROCEDURE — 36415 COLL VENOUS BLD VENIPUNCTURE: CPT

## 2024-01-24 RX ORDER — LEVOTHYROXINE SODIUM 0.07 MG/1
75 TABLET ORAL DAILY
Qty: 30 TABLET | Refills: 5 | Status: SHIPPED | OUTPATIENT
Start: 2024-01-24

## 2024-01-24 RX ORDER — ARIPIPRAZOLE 10 MG/1
10 TABLET ORAL DAILY
Qty: 30 TABLET | Refills: 5 | Status: SHIPPED | OUTPATIENT
Start: 2024-01-24

## 2024-01-24 RX ORDER — ROSUVASTATIN CALCIUM 5 MG/1
5 TABLET, COATED ORAL DAILY
Qty: 30 TABLET | Refills: 5 | Status: SHIPPED | OUTPATIENT
Start: 2024-01-24

## 2024-01-24 RX ORDER — DULOXETIN HYDROCHLORIDE 60 MG/1
60 CAPSULE, DELAYED RELEASE ORAL DAILY
Qty: 30 CAPSULE | Refills: 5 | Status: SHIPPED | OUTPATIENT
Start: 2024-01-24

## 2024-01-24 NOTE — PROGRESS NOTES
Chief Complaint  Depression, Hypothyroidism, and Hyperlipidemia    SUBJECTIVE  Cher Quezada presents to [facility] for 6 month follow up. Pt states depression is well controlled with current regimen       Mammogram scheduled for 01/30/24.    History of Present Illness  Past Medical History:   Diagnosis Date    Anxiety and depression     Arthritis     Bipolar disorder     Cholelithiasis     Chronic allergic rhinitis     Constipation     Hypothyroidism     Migraine     Mood disorder     Obesity     Obstructive sleep apnea       Family History   Problem Relation Age of Onset    Diabetes Mother     Breast cancer Mother 60    Diabetes Father     Sleep apnea Father       Past Surgical History:   Procedure Laterality Date    ANAL FISSURECTOMY      COLON SURGERY      ABDOMINAL    GASTRIC BYPASS  2006    HERNIA REPAIR      ILEOSTOMY REVISION      REVERSAL    RECTAL SURGERY  1994    FISTULA    TUBAL ABDOMINAL LIGATION      URETEROSCOPY LASER LITHOTRIPSY WITH STENT INSERTION Left 9/7/2023    Procedure: URETEROSCOPY LASER LITHOTRIPSY WITH STENT INSERTION;  Surgeon: Ernestine Navarrete MD;  Location: [facility] MAIN OR;  Service: Urology;  Laterality: Left;        Current Outpatient Medications:     ARIPiprazole (ABILIFY) 10 MG tablet, Take 1 tablet by mouth Daily., Disp: 30 tablet, Rfl: 5    buPROPion XL (WELLBUTRIN XL) 150 MG 24 hr tablet, Take 1 tablet by mouth Daily., Disp: 30 tablet, Rfl: 5    Cholecalciferol 25 MCG (1000 UT) capsule, Take 1 capsule by mouth Daily., Disp: , Rfl:     Cyanocobalamin ER 2000 MCG tablet controlled-release, Take 2,000 mcg by mouth Daily., Disp: , Rfl:     DULoxetine (CYMBALTA) 60 MG capsule, Take 1 capsule by mouth Daily., Disp: 30 capsule, Rfl: 5    levothyroxine (Synthroid) 75 MCG tablet, Take 1 tablet by mouth Daily., Disp: 30 tablet, Rfl: 5    loratadine (CLARITIN) 10 MG tablet, Take 1 tablet by mouth Daily., Disp: , Rfl:     rosuvastatin (Crestor) 5 MG  "tablet, Take 1 tablet by mouth Daily., Disp: 30 tablet, Rfl: 5    Zinc Sulfate 220 (50 Zn) MG tablet, Take 50 mg by mouth Daily., Disp: , Rfl:     OBJECTIVE  Vital Signs:   /76   Pulse 88   Ht 160 cm (63\")   Wt (!) 139 kg (306 lb)   SpO2 97%   BMI 54.21 kg/m²    Estimated body mass index is 54.21 kg/m² as calculated from the following:    Height as of this encounter: 160 cm (63\").    Weight as of this encounter: 139 kg (306 lb).     Wt Readings from Last 3 Encounters:   01/24/24 (!) 139 kg (306 lb)   10/27/23 (!) 138 kg (304 lb 6.4 oz)   09/20/23 (!) 137 kg (301 lb)     BP Readings from Last 3 Encounters:   01/24/24 134/76   10/27/23 149/71   09/08/23 142/72       Physical Exam  Vitals reviewed.   Constitutional:       Appearance: Normal appearance. She is well-developed.   HENT:      Head: Normocephalic and atraumatic.      Right Ear: External ear normal.      Left Ear: External ear normal.   Eyes:      Conjunctiva/sclera: Conjunctivae normal.      Pupils: Pupils are equal, round, and reactive to light.   Cardiovascular:      Rate and Rhythm: Normal rate and regular rhythm.      Heart sounds: No murmur heard.     No friction rub. No gallop.   Pulmonary:      Effort: Pulmonary effort is normal.      Breath sounds: Normal breath sounds. No wheezing or rhonchi.   Skin:     General: Skin is warm and dry.   Neurological:      Mental Status: She is alert and oriented to person, place, and time.      Cranial Nerves: No cranial nerve deficit.   Psychiatric:         Mood and Affect: Mood and affect normal.         Behavior: Behavior normal.         Thought Content: Thought content normal.         Judgment: Judgment normal.          Result Review    CMP          9/6/2023    12:03 9/7/2023    02:21 9/8/2023    03:11   CMP   Glucose 145  139  145    BUN 13  13  7    Creatinine 1.37  1.22  0.93    EGFR 45.4  52.2  72.3    Sodium 136  136  140    Potassium 4.5  3.7  4.2    Chloride 101  107  107    Calcium 9.7  8.6  " 9.4    Total Protein 7.5      Albumin 4.1      Globulin 3.4      Total Bilirubin 0.5      Alkaline Phosphatase 135      AST (SGOT) 21      ALT (SGPT) 31      Albumin/Globulin Ratio 1.2      BUN/Creatinine Ratio 9.5  10.7  7.5    Anion Gap 12.1  8.8  10.3      CBC          9/6/2023    12:03 9/7/2023    02:21 9/8/2023    03:11   CBC   WBC 12.83  4.43  9.14    RBC 4.68  3.82  4.45    Hemoglobin 14.8  12.5  14.5    Hematocrit 44.6  36.5  42.8    MCV 95.3  95.5  96.2    MCH 31.6  32.7  32.6    MCHC 33.2  34.2  33.9    RDW 12.3  12.7  12.5    Platelets 269  226  295      Lipid Panel          8/2/2023    09:24   Lipid Panel   Total Cholesterol 191    Triglycerides 130    HDL Cholesterol 70    VLDL Cholesterol 23    LDL Cholesterol  98    LDL/HDL Ratio 1.36      TSH          2/6/2023    12:55 8/2/2023    09:24   TSH   TSH 3.180  3.500      Most Recent A1C          8/2/2023    09:24   HGBA1C Most Recent   Hemoglobin A1C 6.40        A1C Last 3 Results          8/2/2023    09:24   HGBA1C Last 3 Results   Hemoglobin A1C 6.40      Lab Results   Component Value Date    KRAZ04HX 42.6 08/24/2022        Lab Results   Component Value Date    FREET4 1.14 08/02/2023          No Images in the past 120 days found..     The above data has been reviewed by DESMOND Haro 01/24/2024 09:42 EST.          Patient Care Team:  Theresa Chacon APRN as PCP - General (Nurse Practitioner)  Ryan Corbett MD as Consulting Physician (General Surgery)  Ernestine Navarrete MD as Consulting Physician (Urology)            ASSESSMENT & PLAN    Diagnoses and all orders for this visit:    1. Major depressive disorder, recurrent, mild  Overview:  Stable on current, continue current dose    Orders:  -     ARIPiprazole (ABILIFY) 10 MG tablet; Take 1 tablet by mouth Daily.  Dispense: 30 tablet; Refill: 5  -     DULoxetine (CYMBALTA) 60 MG capsule; Take 1 capsule by mouth Daily.  Dispense: 30 capsule; Refill: 5    2. Hypothyroidism, unspecified  type  Overview:  Stable on levothyroxine, continue current dose    Orders:  -     levothyroxine (Synthroid) 75 MCG tablet; Take 1 tablet by mouth Daily.  Dispense: 30 tablet; Refill: 5  -     TSH Rfx On Abnormal To Free T4; Future  -     T4, free; Future    3. Hyperlipidemia, unspecified hyperlipidemia type  Overview:  Stable on Crestor, continue current medication    Orders:  -     rosuvastatin (Crestor) 5 MG tablet; Take 1 tablet by mouth Daily.  Dispense: 30 tablet; Refill: 5  -     Comprehensive Metabolic Panel; Future  -     CBC & Differential; Future  -     Lipid Panel; Future         Tobacco Use: Low Risk  (1/24/2024)    Patient History     Smoking Tobacco Use: Never     Smokeless Tobacco Use: Never     Passive Exposure: Not on file       Follow Up     Return in 6 months (on 7/24/2024), or if symptoms worsen or fail to improve.        Patient was given instructions and counseling regarding her condition or for health maintenance advice. Please see specific information pulled into the AVS if appropriate.   I have reviewed information obtained and documented by others and I have confirmed the accuracy of this documented note.    DESMOND Haro

## 2024-01-25 DIAGNOSIS — R73.03 PREDIABETES: Primary | ICD-10-CM

## 2024-01-25 LAB — HBA1C MFR BLD: 6.7 % (ref 4.8–5.6)

## 2024-01-30 ENCOUNTER — OFFICE VISIT (OUTPATIENT)
Dept: FAMILY MEDICINE CLINIC | Facility: CLINIC | Age: 59
End: 2024-01-30
Payer: COMMERCIAL

## 2024-01-30 ENCOUNTER — HOSPITAL ENCOUNTER (OUTPATIENT)
Dept: MAMMOGRAPHY | Facility: HOSPITAL | Age: 59
Discharge: HOME | End: 2024-01-30
Admitting: NURSE PRACTITIONER
Payer: COMMERCIAL

## 2024-01-30 VITALS
DIASTOLIC BLOOD PRESSURE: 68 MMHG | HEIGHT: 63 IN | WEIGHT: 293 LBS | SYSTOLIC BLOOD PRESSURE: 148 MMHG | BODY MASS INDEX: 51.91 KG/M2 | OXYGEN SATURATION: 96 % | HEART RATE: 86 BPM

## 2024-01-30 DIAGNOSIS — Z12.31 VISIT FOR SCREENING MAMMOGRAM: ICD-10-CM

## 2024-01-30 DIAGNOSIS — F33.0 MAJOR DEPRESSIVE DISORDER, RECURRENT, MILD: ICD-10-CM

## 2024-01-30 DIAGNOSIS — E11.65 TYPE 2 DIABETES MELLITUS WITH HYPERGLYCEMIA, WITHOUT LONG-TERM CURRENT USE OF INSULIN: Primary | ICD-10-CM

## 2024-01-30 DIAGNOSIS — Z12.11 COLON CANCER SCREENING: ICD-10-CM

## 2024-01-30 PROCEDURE — 77063 BREAST TOMOSYNTHESIS BI: CPT

## 2024-01-30 PROCEDURE — 77067 SCR MAMMO BI INCL CAD: CPT

## 2024-01-30 RX ORDER — BUPROPION HYDROCHLORIDE 150 MG/1
150 TABLET ORAL DAILY
Qty: 30 TABLET | Refills: 5 | OUTPATIENT
Start: 2024-01-30

## 2024-01-30 RX ORDER — BUPROPION HYDROCHLORIDE 150 MG/1
150 TABLET ORAL DAILY
Qty: 30 TABLET | Refills: 5 | Status: SHIPPED | OUTPATIENT
Start: 2024-01-30

## 2024-01-30 NOTE — PROGRESS NOTES
Chief Complaint  Diabetes    SUBJECTIVE  Cher Quezada presents to [facility] to follow up on recent lab results. Pts A1C is 6.7 , patient has strong family history of diabetes and A1c has been creeping up over the last couple of years.  Patient reports she has seen dietary diabetic counseling in the past, knows the diet changes she needs to make, she is just having a hard time doing so.      Patient states she believes that she does not need any colon cancer screening, reports she had a colon resection in 2014 and that her surgeon told her that she did not have much colon left and would not need any future colon cancer screening. \    Patient also needing refill of Wellbutrin    History of Present Illness  Past Medical History:   Diagnosis Date    Anxiety and depression     Arthritis     Bipolar disorder     Cholelithiasis     Chronic allergic rhinitis     Constipation     Hypothyroidism     Migraine     Mood disorder     Obesity     Obstructive sleep apnea       Family History   Problem Relation Age of Onset    Diabetes Mother     Breast cancer Mother 60    Diabetes Father     Sleep apnea Father       Past Surgical History:   Procedure Laterality Date    ANAL FISSURECTOMY      COLON SURGERY      ABDOMINAL    GASTRIC BYPASS  2006    HERNIA REPAIR      ILEOSTOMY REVISION      REVERSAL    RECTAL SURGERY  1994    FISTULA    TUBAL ABDOMINAL LIGATION      URETEROSCOPY LASER LITHOTRIPSY WITH STENT INSERTION Left 9/7/2023    Procedure: URETEROSCOPY LASER LITHOTRIPSY WITH STENT INSERTION;  Surgeon: Ernestine Navarrete MD;  Location: [facility]  Service: Urology;  Laterality: Left;        Current Outpatient Medications:     ARIPiprazole (ABILIFY) 10 MG tablet, Take 1 tablet by mouth Daily., Disp: 30 tablet, Rfl: 5    buPROPion XL (WELLBUTRIN XL) 150 MG 24 hr tablet, Take 1 tablet by mouth Daily., Disp: 30 tablet, Rfl: 5    Cholecalciferol 25 MCG (1000 UT) capsule, Take 1 capsule  "by mouth Daily., Disp: , Rfl:     Cyanocobalamin ER 2000 MCG tablet controlled-release, Take 2,000 mcg by mouth Daily., Disp: , Rfl:     DULoxetine (CYMBALTA) 60 MG capsule, Take 1 capsule by mouth Daily., Disp: 30 capsule, Rfl: 5    levothyroxine (Synthroid) 75 MCG tablet, Take 1 tablet by mouth Daily., Disp: 30 tablet, Rfl: 5    loratadine (CLARITIN) 10 MG tablet, Take 1 tablet by mouth Daily., Disp: , Rfl:     rosuvastatin (Crestor) 5 MG tablet, Take 1 tablet by mouth Daily., Disp: 30 tablet, Rfl: 5    Zinc Sulfate 220 (50 Zn) MG tablet, Take 50 mg by mouth Daily., Disp: , Rfl:     metFORMIN (GLUCOPHAGE) 500 MG tablet, Take 1 tablet by mouth 2 (Two) Times a Day With Meals., Disp: 180 tablet, Rfl: 1    OBJECTIVE  Vital Signs:   /68   Pulse 86   Ht 160 cm (63\")   Wt (!) 139 kg (306 lb)   SpO2 96%   BMI 54.21 kg/m²    Estimated body mass index is 54.21 kg/m² as calculated from the following:    Height as of this encounter: 160 cm (63\").    Weight as of this encounter: 139 kg (306 lb).     Wt Readings from Last 3 Encounters:   01/30/24 (!) 139 kg (306 lb)   01/24/24 (!) 139 kg (306 lb)   10/27/23 (!) 138 kg (304 lb 6.4 oz)     BP Readings from Last 3 Encounters:   01/30/24 148/68   01/24/24 134/76   10/27/23 149/71       Physical Exam  Vitals reviewed.   Constitutional:       Appearance: Normal appearance. She is well-developed.   HENT:      Head: Normocephalic and atraumatic.      Right Ear: External ear normal.      Left Ear: External ear normal.   Eyes:      Conjunctiva/sclera: Conjunctivae normal.      Pupils: Pupils are equal, round, and reactive to light.   Cardiovascular:      Rate and Rhythm: Normal rate and regular rhythm.      Heart sounds: No murmur heard.     No friction rub. No gallop.   Pulmonary:      Effort: Pulmonary effort is normal.      Breath sounds: Normal breath sounds. No wheezing or rhonchi.   Skin:     General: Skin is warm and dry.   Neurological:      Mental Status: She is alert " and oriented to person, place, and time.      Cranial Nerves: No cranial nerve deficit.   Psychiatric:         Mood and Affect: Mood and affect normal.         Behavior: Behavior normal.         Thought Content: Thought content normal.         Judgment: Judgment normal.          Result Review    CMP          9/7/2023    02:21 9/8/2023    03:11 1/24/2024    10:19   CMP   Glucose 139  145  131    BUN 13  7  10    Creatinine 1.22  0.93  1.06    EGFR 52.2  72.3  61.8    Sodium 136  140  138    Potassium 3.7  4.2  4.6    Chloride 107  107  105    Calcium 8.6  9.4  9.4    Total Protein   7.1    Albumin   4.0    Globulin   3.1    Total Bilirubin   0.3    Alkaline Phosphatase   150    AST (SGOT)   30    ALT (SGPT)   44    Albumin/Globulin Ratio   1.3    BUN/Creatinine Ratio 10.7  7.5  9.4    Anion Gap 8.8  10.3  11.9      CBC          9/7/2023    02:21 9/8/2023    03:11 1/24/2024    10:19   CBC   WBC 4.43  9.14  6.08    RBC 3.82  4.45  4.57    Hemoglobin 12.5  14.5  14.7    Hematocrit 36.5  42.8  43.0    MCV 95.5  96.2  94.1    MCH 32.7  32.6  32.2    MCHC 34.2  33.9  34.2    RDW 12.7  12.5  11.8    Platelets 226  295  322      Lipid Panel          8/2/2023    09:24 1/24/2024    10:19   Lipid Panel   Total Cholesterol 191  192    Triglycerides 130  122    HDL Cholesterol 70  69    VLDL Cholesterol 23  21    LDL Cholesterol  98  102    LDL/HDL Ratio 1.36  1.43      TSH          2/6/2023    12:55 8/2/2023    09:24 1/24/2024    10:19   TSH   TSH 3.180  3.500  3.530      A1C Last 3 Results          8/2/2023    09:24 1/24/2024    10:19   HGBA1C Last 3 Results   Hemoglobin A1C 6.40  6.70        No Images in the past 120 days found..     The above data has been reviewed by DESMOND Haro 01/30/2024 08:32 EST.          Patient Care Team:  Theresa Chacon APRN as PCP - General (Nurse Practitioner)  Ryan Corbett MD as Consulting Physician (General Surgery)  Ernestine Navarrete MD as Consulting Physician (Urology)             ASSESSMENT & PLAN    Diagnoses and all orders for this visit:    1. Type 2 diabetes mellitus with hyperglycemia, without long-term current use of insulin (Primary)  Assessment & Plan:  We discussed a multitude of various treatment options including the injectables such as Ozempic, and metformin, after discussion patient has decided she would like to move forward with trialing metformin, side effects administration of medication discussed, we will follow-up in 3 months for reevaluation    Orders:  -     metFORMIN (GLUCOPHAGE) 500 MG tablet; Take 1 tablet by mouth 2 (Two) Times a Day With Meals.  Dispense: 180 tablet; Refill: 1    2. Colon cancer screening  Comments:  Referral placed with note to discuss with provider if this is needed  Orders:  -     Ambulatory Referral to General Surgery    3. Major depressive disorder, recurrent, mild  Overview:  Stable on current, continue current dose    Orders:  -     buPROPion XL (WELLBUTRIN XL) 150 MG 24 hr tablet; Take 1 tablet by mouth Daily.  Dispense: 30 tablet; Refill: 5         Tobacco Use: Low Risk  (1/30/2024)    Patient History     Smoking Tobacco Use: Never     Smokeless Tobacco Use: Never     Passive Exposure: Not on file       Follow Up     Return in about 3 months (around 4/30/2024), or if symptoms worsen or fail to improve.        Patient was given instructions and counseling regarding her condition or for health maintenance advice. Please see specific information pulled into the AVS if appropriate.   I have reviewed information obtained and documented by others and I have confirmed the accuracy of this documented note.    DESMOND Haro

## 2024-01-30 NOTE — ASSESSMENT & PLAN NOTE
We discussed a multitude of various treatment options including the injectables such as Ozempic, and metformin, after discussion patient has decided she would like to move forward with trialing metformin, side effects administration of medication discussed, we will follow-up in 3 months for reevaluation

## 2024-02-07 ENCOUNTER — TELEPHONE (OUTPATIENT)
Dept: SURGERY | Facility: CLINIC | Age: 59
End: 2024-02-07
Payer: COMMERCIAL

## 2024-02-07 NOTE — TELEPHONE ENCOUNTER
PATIENT CALLED STATING HER PCP IS TELLING SHE HAS TO HAVE COLONOSCOPY BUT SHE SAYS DR. PARDO TOLD HER THAT SHE WOULD NEVER NEED ONE AGAIN AFTER HER SURGERY. CAN YOU CALL HER AND LET HER KNOW WHAT SHE NEEDS TO DO.

## 2024-02-09 NOTE — TELEPHONE ENCOUNTER
Patient called back, but I did not know what the patient needs to be told. Please call patient back

## 2024-05-13 ENCOUNTER — TELEPHONE (OUTPATIENT)
Dept: FAMILY MEDICINE CLINIC | Facility: CLINIC | Age: 59
End: 2024-05-13

## 2024-05-13 ENCOUNTER — OFFICE VISIT (OUTPATIENT)
Dept: FAMILY MEDICINE CLINIC | Facility: CLINIC | Age: 59
End: 2024-05-13
Payer: COMMERCIAL

## 2024-05-13 VITALS
SYSTOLIC BLOOD PRESSURE: 143 MMHG | WEIGHT: 293 LBS | HEIGHT: 63 IN | BODY MASS INDEX: 51.91 KG/M2 | DIASTOLIC BLOOD PRESSURE: 78 MMHG | OXYGEN SATURATION: 98 % | HEART RATE: 74 BPM

## 2024-05-13 DIAGNOSIS — Z53.20 COLON CANCER SCREENING DECLINED: ICD-10-CM

## 2024-05-13 DIAGNOSIS — M54.42 LEFT-SIDED LOW BACK PAIN WITH LEFT-SIDED SCIATICA, UNSPECIFIED CHRONICITY: ICD-10-CM

## 2024-05-13 DIAGNOSIS — H61.21 IMPACTED CERUMEN OF RIGHT EAR: Primary | ICD-10-CM

## 2024-05-13 PROCEDURE — 99214 OFFICE O/P EST MOD 30 MIN: CPT | Performed by: NURSE PRACTITIONER

## 2024-05-13 PROCEDURE — 96372 THER/PROPH/DIAG INJ SC/IM: CPT | Performed by: NURSE PRACTITIONER

## 2024-05-13 RX ORDER — KETOROLAC TROMETHAMINE 30 MG/ML
60 INJECTION, SOLUTION INTRAMUSCULAR; INTRAVENOUS ONCE
Status: COMPLETED | OUTPATIENT
Start: 2024-05-13 | End: 2024-05-13

## 2024-05-13 RX ADMIN — KETOROLAC TROMETHAMINE 60 MG: 30 INJECTION, SOLUTION INTRAMUSCULAR; INTRAVENOUS at 15:01

## 2024-05-13 NOTE — PROGRESS NOTES
Chief Complaint  Back Pain    SUBJECTIVE  Cher Quezada presents to [facility] for back pain for one week. Pt stats she is unsure of any injury but the pain is getting worse. Pt is taking Tylenol with no relief.     Pt states low back on left side and hip, states that she has history of low back pain which flares up from time to time, states that from time to time her symptoms flareup.  Pain is in the left side lower back, radiates down into her buttocks, and down her leg at times.  Denies any numbness or tingling, no saddle anesthesia    Patient also complaining of hearing coming and going in her right ear.  Feels clogged    History of Present Illness  Past Medical History:   Diagnosis Date    Anxiety and depression     Arthritis     Bipolar disorder     Cholelithiasis     Chronic allergic rhinitis     Constipation     Hypothyroidism     Migraine     Mood disorder     Obesity     Obstructive sleep apnea       Family History   Problem Relation Age of Onset    Diabetes Mother     Breast cancer Mother 60    Diabetes Father     Sleep apnea Father       Past Surgical History:   Procedure Laterality Date    ANAL FISSURECTOMY      COLON SURGERY      ABDOMINAL    GASTRIC BYPASS  2006    HERNIA REPAIR      ILEOSTOMY REVISION      REVERSAL    RECTAL SURGERY  1994    FISTULA    TUBAL ABDOMINAL LIGATION      URETEROSCOPY LASER LITHOTRIPSY WITH STENT INSERTION Left 9/7/2023    Procedure: URETEROSCOPY LASER LITHOTRIPSY WITH STENT INSERTION;  Surgeon: Ernestine Navarrete MD;  Location: [facility];  Service: Urology;  Laterality: Left;        Current Outpatient Medications:     ARIPiprazole (ABILIFY) 10 MG tablet, Take 1 tablet by mouth Daily., Disp: 30 tablet, Rfl: 5    buPROPion XL (WELLBUTRIN XL) 150 MG 24 hr tablet, Take 1 tablet by mouth Daily., Disp: 30 tablet, Rfl: 5    Cholecalciferol 25 MCG (1000 UT) capsule, Take 1 capsule by mouth Daily., Disp: , Rfl:     Cyanocobalamin ER  "2000 MCG tablet controlled-release, Take 2,000 mcg by mouth Daily., Disp: , Rfl:     DULoxetine (CYMBALTA) 60 MG capsule, Take 1 capsule by mouth Daily., Disp: 30 capsule, Rfl: 5    levothyroxine (Synthroid) 75 MCG tablet, Take 1 tablet by mouth Daily., Disp: 30 tablet, Rfl: 5    loratadine (CLARITIN) 10 MG tablet, Take 1 tablet by mouth Daily., Disp: , Rfl:     metFORMIN (GLUCOPHAGE) 500 MG tablet, Take 1 tablet by mouth 2 (Two) Times a Day With Meals., Disp: 180 tablet, Rfl: 1    rosuvastatin (Crestor) 5 MG tablet, Take 1 tablet by mouth Daily., Disp: 30 tablet, Rfl: 5    Zinc Sulfate 220 (50 Zn) MG tablet, Take 50 mg by mouth Daily., Disp: , Rfl:     predniSONE (DELTASONE) 20 MG tablet, 3 tabs p.o. daily for 3 days, then 2 tabs p.o. daily for 2 days, then 1 tab p.o. daily for 2 days, Disp: 15 tablet, Rfl: 0    tiZANidine (ZANAFLEX) 2 MG tablet, Take 1 tablet by mouth Every 8 (Eight) Hours As Needed for Muscle Spasms., Disp: 30 tablet, Rfl: 1  No current facility-administered medications for this visit.    OBJECTIVE  Vital Signs:   /78   Pulse 74   Ht 160 cm (63\")   Wt 134 kg (295 lb)   SpO2 98%   BMI 52.26 kg/m²    Estimated body mass index is 52.26 kg/m² as calculated from the following:    Height as of this encounter: 160 cm (63\").    Weight as of this encounter: 134 kg (295 lb).     Wt Readings from Last 3 Encounters:   05/13/24 134 kg (295 lb)   01/30/24 (!) 139 kg (306 lb)   01/24/24 (!) 139 kg (306 lb)     BP Readings from Last 3 Encounters:   05/13/24 143/78   01/30/24 148/68   01/24/24 134/76       Physical Exam  Vitals reviewed.   Constitutional:       Appearance: Normal appearance. She is well-developed.   HENT:      Head: Normocephalic and atraumatic.      Right Ear: External ear normal.      Left Ear: External ear normal.   Eyes:      Conjunctiva/sclera: Conjunctivae normal.      Pupils: Pupils are equal, round, and reactive to light.   Cardiovascular:      Rate and Rhythm: Normal rate and " regular rhythm.      Heart sounds: No murmur heard.     No friction rub. No gallop.   Pulmonary:      Effort: Pulmonary effort is normal.      Breath sounds: Normal breath sounds. No wheezing or rhonchi.   Musculoskeletal:      Lumbar back: Tenderness present. No bony tenderness. Decreased range of motion. Positive left straight leg raise test. Negative right straight leg raise test.   Skin:     General: Skin is warm and dry.   Neurological:      Mental Status: She is alert and oriented to person, place, and time.      Cranial Nerves: No cranial nerve deficit.   Psychiatric:         Mood and Affect: Mood and affect normal.         Behavior: Behavior normal.         Thought Content: Thought content normal.         Judgment: Judgment normal.          Result Review    CMP          9/7/2023    02:21 9/8/2023    03:11 1/24/2024    10:19   CMP   Glucose 139  145  131    BUN 13  7  10    Creatinine 1.22  0.93  1.06    EGFR 52.2  72.3  61.8    Sodium 136  140  138    Potassium 3.7  4.2  4.6    Chloride 107  107  105    Calcium 8.6  9.4  9.4    Total Protein   7.1    Albumin   4.0    Globulin   3.1    Total Bilirubin   0.3    Alkaline Phosphatase   150    AST (SGOT)   30    ALT (SGPT)   44    Albumin/Globulin Ratio   1.3    BUN/Creatinine Ratio 10.7  7.5  9.4    Anion Gap 8.8  10.3  11.9      CBC          9/7/2023    02:21 9/8/2023    03:11 1/24/2024    10:19   CBC   WBC 4.43  9.14  6.08    RBC 3.82  4.45  4.57    Hemoglobin 12.5  14.5  14.7    Hematocrit 36.5  42.8  43.0    MCV 95.5  96.2  94.1    MCH 32.7  32.6  32.2    MCHC 34.2  33.9  34.2    RDW 12.7  12.5  11.8    Platelets 226  295  322      Lipid Panel          8/2/2023    09:24 1/24/2024    10:19   Lipid Panel   Total Cholesterol 191  192    Triglycerides 130  122    HDL Cholesterol 70  69    VLDL Cholesterol 23  21    LDL Cholesterol  98  102    LDL/HDL Ratio 1.36  1.43      TSH          8/2/2023    09:24 1/24/2024    10:19   TSH   TSH 3.500  3.530      Most Recent  A1C          1/24/2024    10:19   HGBA1C Most Recent   Hemoglobin A1C 6.70        A1C Last 3 Results          8/2/2023    09:24 1/24/2024    10:19   HGBA1C Last 3 Results   Hemoglobin A1C 6.40  6.70      Lab Results   Component Value Date    BRIU17WI 42.6 08/24/2022        Lab Results   Component Value Date    FREET4 1.06 01/24/2024          Mammo Screening Digital Tomosynthesis Bilateral With CAD    Result Date: 1/30/2024   Benign mammogram. Suggest routine mammographic screening.  RECOMMENDATION(S):  ROUTINE MAMMOGRAM AND CLINICAL EVALUATION IN 12 MONTHS.   BIRADS:  DIAGNOSTIC CATEGORY 1--NEGATIVE.   BREAST COMPOSITION: Scattered areas fibroglandular density.  PLEASE NOTE:  A NORMAL MAMMOGRAM DOES NOT EXCLUDE THE POSSIBILITY OF BREAST CANCER. ANY CLINICALLY SUSPICIOUS PALPABLE LUMP SHOULD BE BIOPSIED.      CHRIS BROWNLEE MD       Electronically Signed and Approved By: CHRIS BROWNLEE MD on 1/30/2024 at 16:02                The above data has been reviewed by DESMOND Haro 05/13/2024 10:16 EDT.    Ear Cerumen Removal    Date/Time: 5/14/2024 7:00 AM    Performed by: Theresa Chacon APRN  Authorized by: Theresa Chacon APRN  Location details: right ear  Patient tolerance: patient tolerated the procedure well with no immediate complications  Procedure type: irrigation          Patient Care Team:  Theresa Chacon APRN as PCP - General (Nurse Practitioner)  Ryan Corbett MD as Consulting Physician (General Surgery)  Ernestine Navarrete MD as Consulting Physician (Urology)    Class 3 Severe Obesity (BMI >=40). Obesity-related health conditions include the following: dyslipidemias. Obesity is improving with lifestyle modifications. BMI is is above average; BMI management plan is completed. We discussed portion control and increasing exercise.       ASSESSMENT & PLAN    Diagnoses and all orders for this visit:    1. Impacted cerumen of right ear (Primary)  -     Ear Cerumen Removal    2. Colon  cancer screening declined  Assessment & Plan:  Patient states she still declines colonoscopy, states that she called [facility] office and spoke with her previous surgeon and and that he confirmed due to the extent of her previous surgery she would no longer need colonoscopies      3. Left-sided low back pain with left-sided sciatica, unspecified chronicity  Comments:  Patient declines imaging or referral at present, will follow-up if no improvement with medication, side effects and admin discussed  Orders:  -     predniSONE (DELTASONE) 20 MG tablet; 3 tabs p.o. daily for 3 days, then 2 tabs p.o. daily for 2 days, then 1 tab p.o. daily for 2 days  Dispense: 15 tablet; Refill: 0  -     tiZANidine (ZANAFLEX) 2 MG tablet; Take 1 tablet by mouth Every 8 (Eight) Hours As Needed for Muscle Spasms.  Dispense: 30 tablet; Refill: 1  -     ketorolac (TORADOL) injection 60 mg         Tobacco Use: Low Risk  (5/14/2024)    Patient History     Smoking Tobacco Use: Never     Smokeless Tobacco Use: Never     Passive Exposure: Not on file       Follow Up     Return if symptoms worsen or fail to improve.        Patient was given instructions and counseling regarding her condition or for health maintenance advice. Please see specific information pulled into the AVS if appropriate.   I have reviewed information obtained and documented by others and I have confirmed the accuracy of this documented note.    DESMOND Haro

## 2024-05-13 NOTE — TELEPHONE ENCOUNTER
Caller: Cher Quezada    Relationship: Self    Best call back number: [phone]     What is the best time to reach you: ANY     Who are you requesting to speak with (clinical staff, provider,  specific staff member): CLINICAL    What was the call regarding: CALLER STATED THAT THE MEDICATIONS THAT WERE DISCUSSED AT TODAY'S APPOINTMENT FOR BACK PAIN HAVE NOT BEEN RECEIVED BY THE PHARMACY.    [facility] - [place], [place] - [address] - [phone] [facility] [phone] FX     Is it okay if the provider responds through MyChart: NO

## 2024-05-13 NOTE — ASSESSMENT & PLAN NOTE
Patient states she still declines colonoscopy, states that she called [facility] office and spoke with her previous surgeon and and that he confirmed due to the extent of her previous surgery she would no longer need colonoscopies

## 2024-05-14 PROCEDURE — 69209 REMOVE IMPACTED EAR WAX UNI: CPT | Performed by: NURSE PRACTITIONER

## 2024-05-14 RX ORDER — PREDNISONE 20 MG/1
TABLET ORAL
Qty: 15 TABLET | Refills: 0 | Status: SHIPPED | OUTPATIENT
Start: 2024-05-14

## 2024-05-14 RX ORDER — TIZANIDINE 2 MG/1
2 TABLET ORAL EVERY 8 HOURS PRN
Qty: 30 TABLET | Refills: 1 | Status: SHIPPED | OUTPATIENT
Start: 2024-05-14

## 2024-07-25 ENCOUNTER — OFFICE VISIT (OUTPATIENT)
Dept: FAMILY MEDICINE CLINIC | Facility: CLINIC | Age: 59
End: 2024-07-25
Payer: COMMERCIAL

## 2024-07-25 VITALS
HEART RATE: 76 BPM | OXYGEN SATURATION: 97 % | DIASTOLIC BLOOD PRESSURE: 63 MMHG | HEIGHT: 63 IN | WEIGHT: 293 LBS | BODY MASS INDEX: 51.91 KG/M2 | SYSTOLIC BLOOD PRESSURE: 111 MMHG

## 2024-07-25 DIAGNOSIS — E11.65 TYPE 2 DIABETES MELLITUS WITH HYPERGLYCEMIA, WITHOUT LONG-TERM CURRENT USE OF INSULIN: ICD-10-CM

## 2024-07-25 DIAGNOSIS — F33.0 MAJOR DEPRESSIVE DISORDER, RECURRENT, MILD: ICD-10-CM

## 2024-07-25 DIAGNOSIS — E78.5 HYPERLIPIDEMIA, UNSPECIFIED HYPERLIPIDEMIA TYPE: ICD-10-CM

## 2024-07-25 DIAGNOSIS — E03.9 HYPOTHYROIDISM, UNSPECIFIED TYPE: ICD-10-CM

## 2024-07-25 PROCEDURE — 99214 OFFICE O/P EST MOD 30 MIN: CPT | Performed by: NURSE PRACTITIONER

## 2024-07-25 RX ORDER — ARIPIPRAZOLE 10 MG/1
10 TABLET ORAL DAILY
Qty: 30 TABLET | Refills: 5 | Status: SHIPPED | OUTPATIENT
Start: 2024-07-25

## 2024-07-25 RX ORDER — LEVOTHYROXINE SODIUM 0.07 MG/1
75 TABLET ORAL DAILY
Qty: 30 TABLET | Refills: 5 | Status: SHIPPED | OUTPATIENT
Start: 2024-07-25

## 2024-07-25 RX ORDER — ROSUVASTATIN CALCIUM 5 MG/1
5 TABLET, COATED ORAL DAILY
Qty: 30 TABLET | Refills: 5 | Status: SHIPPED | OUTPATIENT
Start: 2024-07-25

## 2024-07-25 RX ORDER — DULOXETIN HYDROCHLORIDE 60 MG/1
60 CAPSULE, DELAYED RELEASE ORAL DAILY
Qty: 30 CAPSULE | Refills: 5 | Status: SHIPPED | OUTPATIENT
Start: 2024-07-25

## 2024-07-25 RX ORDER — BUPROPION HYDROCHLORIDE 150 MG/1
150 TABLET ORAL DAILY
Qty: 30 TABLET | Refills: 5 | Status: SHIPPED | OUTPATIENT
Start: 2024-07-25

## 2024-07-25 RX ORDER — MULTIPLE VITAMINS W/ MINERALS TAB 9MG-400MCG
1 TAB ORAL DAILY
COMMUNITY

## 2024-07-25 NOTE — PROGRESS NOTES
Chief Complaint  Diabetes, Depression, Hypothyroidism, and Hyperlipidemia    SUBJECTIVE  Cher Quezada presents to [facility] for six month follow up on Diabetes, Depression, Hypothyroidism, and Hyperlipidemia    Patient has no complaints, states she is tolerating the metformin well  History of Present Illness  Past Medical History:   Diagnosis Date    Anxiety and depression     Arthritis     Bipolar disorder     Cholelithiasis     Chronic allergic rhinitis     Constipation     Hypothyroidism     Migraine     Mood disorder     Obesity     Obstructive sleep apnea       Family History   Problem Relation Age of Onset    Diabetes Mother     Breast cancer Mother 60    Diabetes Father     Sleep apnea Father       Past Surgical History:   Procedure Laterality Date    ANAL FISSURECTOMY      COLON SURGERY      ABDOMINAL    GASTRIC BYPASS  2006    HERNIA REPAIR      ILEOSTOMY REVISION      REVERSAL    RECTAL SURGERY  1994    FISTULA    TUBAL ABDOMINAL LIGATION      URETEROSCOPY LASER LITHOTRIPSY WITH STENT INSERTION Left 9/7/2023    Procedure: URETEROSCOPY LASER LITHOTRIPSY WITH STENT INSERTION;  Surgeon: Ernestine Navarrete MD;  Location: formerly [facility] MAIN OR;  Service: Urology;  Laterality: Left;        Current Outpatient Medications:     ARIPiprazole (ABILIFY) 10 MG tablet, Take 1 tablet by mouth Daily., Disp: 30 tablet, Rfl: 5    buPROPion XL (WELLBUTRIN XL) 150 MG 24 hr tablet, Take 1 tablet by mouth Daily., Disp: 30 tablet, Rfl: 5    Calcium-Vitamin D-Vitamin K (VIACTIV PO), Take  by mouth., Disp: , Rfl:     Cholecalciferol 25 MCG (1000 UT) capsule, Take 1 capsule by mouth Daily., Disp: , Rfl:     Cyanocobalamin ER 2000 MCG tablet controlled-release, Take 2,000 mcg by mouth Daily., Disp: , Rfl:     DULoxetine (CYMBALTA) 60 MG capsule, Take 1 capsule by mouth Daily., Disp: 30 capsule, Rfl: 5    levothyroxine (Synthroid) 75 MCG tablet, Take 1 tablet by mouth Daily., Disp: 30 tablet, Rfl:  "5    loratadine (CLARITIN) 10 MG tablet, Take 1 tablet by mouth Daily., Disp: , Rfl:     metFORMIN (GLUCOPHAGE) 500 MG tablet, Take 1 tablet by mouth 2 (Two) Times a Day With Meals., Disp: 60 tablet, Rfl: 5    multivitamin with minerals tablet tablet, Take 1 tablet by mouth Daily., Disp: , Rfl:     rosuvastatin (Crestor) 5 MG tablet, Take 1 tablet by mouth Daily., Disp: 30 tablet, Rfl: 5    tiZANidine (ZANAFLEX) 2 MG tablet, Take 1 tablet by mouth Every 8 (Eight) Hours As Needed for Muscle Spasms., Disp: 30 tablet, Rfl: 1    Zinc Sulfate 220 (50 Zn) MG tablet, Take 50 mg by mouth Daily., Disp: , Rfl:     OBJECTIVE  Vital Signs:   /63   Pulse 76   Ht 160 cm (63\")   Wt 136 kg (300 lb)   SpO2 97%   BMI 53.14 kg/m²    Estimated body mass index is 53.14 kg/m² as calculated from the following:    Height as of this encounter: 160 cm (63\").    Weight as of this encounter: 136 kg (300 lb).     Wt Readings from Last 3 Encounters:   07/25/24 136 kg (300 lb)   05/13/24 134 kg (295 lb)   01/30/24 (!) 139 kg (306 lb)     BP Readings from Last 3 Encounters:   07/25/24 111/63   05/13/24 143/78   01/30/24 148/68       Physical Exam  Vitals reviewed.   Constitutional:       Appearance: Normal appearance. She is well-developed.   HENT:      Head: Normocephalic and atraumatic.      Right Ear: External ear normal.      Left Ear: External ear normal.   Eyes:      Conjunctiva/sclera: Conjunctivae normal.      Pupils: Pupils are equal, round, and reactive to light.   Cardiovascular:      Rate and Rhythm: Normal rate and regular rhythm.      Heart sounds: No murmur heard.     No friction rub. No gallop.   Pulmonary:      Effort: Pulmonary effort is normal.      Breath sounds: Normal breath sounds. No wheezing or rhonchi.   Skin:     General: Skin is warm and dry.   Neurological:      Mental Status: She is alert and oriented to person, place, and time.      Cranial Nerves: No cranial nerve deficit.   Psychiatric:         Mood and " Affect: Mood and affect normal.         Behavior: Behavior normal.         Thought Content: Thought content normal.         Judgment: Judgment normal.          Result Review    CMP          9/7/2023    02:21 9/8/2023    03:11 1/24/2024    10:19   CMP   Glucose 139  145  131    BUN 13  7  10    Creatinine 1.22  0.93  1.06    EGFR 52.2  72.3  61.8    Sodium 136  140  138    Potassium 3.7  4.2  4.6    Chloride 107  107  105    Calcium 8.6  9.4  9.4    Total Protein   7.1    Albumin   4.0    Globulin   3.1    Total Bilirubin   0.3    Alkaline Phosphatase   150    AST (SGOT)   30    ALT (SGPT)   44    Albumin/Globulin Ratio   1.3    BUN/Creatinine Ratio 10.7  7.5  9.4    Anion Gap 8.8  10.3  11.9      CBC          9/7/2023    02:21 9/8/2023    03:11 1/24/2024    10:19   CBC   WBC 4.43  9.14  6.08    RBC 3.82  4.45  4.57    Hemoglobin 12.5  14.5  14.7    Hematocrit 36.5  42.8  43.0    MCV 95.5  96.2  94.1    MCH 32.7  32.6  32.2    MCHC 34.2  33.9  34.2    RDW 12.7  12.5  11.8    Platelets 226  295  322      Lipid Panel          8/2/2023    09:24 1/24/2024    10:19   Lipid Panel   Total Cholesterol 191  192    Triglycerides 130  122    HDL Cholesterol 70  69    VLDL Cholesterol 23  21    LDL Cholesterol  98  102    LDL/HDL Ratio 1.36  1.43      TSH          8/2/2023    09:24 1/24/2024    10:19   TSH   TSH 3.500  3.530      Most Recent A1C          1/24/2024    10:19   HGBA1C Most Recent   Hemoglobin A1C 6.70        A1C Last 3 Results          8/2/2023    09:24 1/24/2024    10:19   HGBA1C Last 3 Results   Hemoglobin A1C 6.40  6.70      Lab Results   Component Value Date    OJID94BA 42.6 08/24/2022        Lab Results   Component Value Date    FREET4 1.06 01/24/2024          No Images in the past 120 days found..     The above data has been reviewed by DESMOND Haro 07/25/2024 13:47 EDT.          Patient Care Team:  Theresa Chacon APRN as PCP - General (Nurse Practitioner)  Ryan Corbett MD as Consulting  Physician (General Surgery)  Ernestine Navarrete MD as Consulting Physician (Urology)            ASSESSMENT & PLAN    Diagnoses and all orders for this visit:    1. Hyperlipidemia, unspecified hyperlipidemia type  Overview:  Stable on Crestor, continue current medication    Orders:  -     rosuvastatin (Crestor) 5 MG tablet; Take 1 tablet by mouth Daily.  Dispense: 30 tablet; Refill: 5    2. Type 2 diabetes mellitus with hyperglycemia, without long-term current use of insulin  Assessment & Plan:  Pt tolerating metformin well, has managed to lose a little weight, we will continue current dose and recheck A1c today    Orders:  -     Comprehensive Metabolic Panel; Future  -     Lipid Panel; Future  -     Hemoglobin A1c; Future  -     Microalbumin / Creatinine Urine Ratio - Urine, Clean Catch; Future  -     Cancel: CBC w AUTO Differential; Future  -     metFORMIN (GLUCOPHAGE) 500 MG tablet; Take 1 tablet by mouth 2 (Two) Times a Day With Meals.  Dispense: 60 tablet; Refill: 5  -     TSH+Free T4; Future  -     CBC w AUTO Differential; Future    3. Hypothyroidism, unspecified type  Overview:  Stable on levothyroxine, continue current dose    Orders:  -     levothyroxine (Synthroid) 75 MCG tablet; Take 1 tablet by mouth Daily.  Dispense: 30 tablet; Refill: 5  -     TSH+Free T4; Future    4. Major depressive disorder, recurrent, mild  Overview:  Stable on current, continue current dose    Orders:  -     DULoxetine (CYMBALTA) 60 MG capsule; Take 1 capsule by mouth Daily.  Dispense: 30 capsule; Refill: 5  -     buPROPion XL (WELLBUTRIN XL) 150 MG 24 hr tablet; Take 1 tablet by mouth Daily.  Dispense: 30 tablet; Refill: 5  -     ARIPiprazole (ABILIFY) 10 MG tablet; Take 1 tablet by mouth Daily.  Dispense: 30 tablet; Refill: 5         Tobacco Use: Low Risk  (7/25/2024)    Patient History     Smoking Tobacco Use: Never     Smokeless Tobacco Use: Never     Passive Exposure: Not on file       Follow Up     Return in about 6 months  (around 1/25/2025), or if symptoms worsen or fail to improve.        Patient was given instructions and counseling regarding her condition or for health maintenance advice. Please see specific information pulled into the AVS if appropriate.   I have reviewed information obtained and documented by others and I have confirmed the accuracy of this documented note.    Theresa Chacon, APRN

## 2024-07-25 NOTE — ASSESSMENT & PLAN NOTE
Pt tolerating metformin well, has managed to lose a little weight, we will continue current dose and recheck A1c today

## 2024-07-30 ENCOUNTER — LAB (OUTPATIENT)
Dept: LAB | Facility: HOSPITAL | Age: 59
End: 2024-07-30
Payer: COMMERCIAL

## 2024-07-30 DIAGNOSIS — E03.9 HYPOTHYROIDISM, UNSPECIFIED TYPE: ICD-10-CM

## 2024-07-30 DIAGNOSIS — E11.65 TYPE 2 DIABETES MELLITUS WITH HYPERGLYCEMIA, WITHOUT LONG-TERM CURRENT USE OF INSULIN: ICD-10-CM

## 2024-07-30 LAB
ALBUMIN SERPL-MCNC: 3.9 G/DL (ref 3.5–5.2)
ALBUMIN/GLOB SERPL: 1.3 G/DL
ALP SERPL-CCNC: 119 U/L (ref 39–117)
ALT SERPL W P-5'-P-CCNC: 26 U/L (ref 1–33)
ANION GAP SERPL CALCULATED.3IONS-SCNC: 13.2 MMOL/L (ref 5–15)
AST SERPL-CCNC: 22 U/L (ref 1–32)
BASOPHILS # BLD AUTO: 0.04 10*3/MM3 (ref 0–0.2)
BASOPHILS NFR BLD AUTO: 0.7 % (ref 0–1.5)
BILIRUB SERPL-MCNC: 0.2 MG/DL (ref 0–1.2)
BUN SERPL-MCNC: 8 MG/DL (ref 6–20)
BUN/CREAT SERPL: 8.1 (ref 7–25)
CALCIUM SPEC-SCNC: 9.5 MG/DL (ref 8.6–10.5)
CHLORIDE SERPL-SCNC: 105 MMOL/L (ref 98–107)
CHOLEST SERPL-MCNC: 170 MG/DL (ref 0–200)
CO2 SERPL-SCNC: 20.8 MMOL/L (ref 22–29)
CREAT SERPL-MCNC: 0.99 MG/DL (ref 0.57–1)
DEPRECATED RDW RBC AUTO: 41.8 FL (ref 37–54)
EGFRCR SERPLBLD CKD-EPI 2021: 66.6 ML/MIN/1.73
EOSINOPHIL # BLD AUTO: 0.08 10*3/MM3 (ref 0–0.4)
EOSINOPHIL NFR BLD AUTO: 1.5 % (ref 0.3–6.2)
ERYTHROCYTE [DISTWIDTH] IN BLOOD BY AUTOMATED COUNT: 11.8 % (ref 12.3–15.4)
GLOBULIN UR ELPH-MCNC: 2.9 GM/DL
GLUCOSE SERPL-MCNC: 122 MG/DL (ref 65–99)
HBA1C MFR BLD: 6.2 % (ref 4.8–5.6)
HCT VFR BLD AUTO: 44 % (ref 34–46.6)
HDLC SERPL-MCNC: 71 MG/DL (ref 40–60)
HGB BLD-MCNC: 14.4 G/DL (ref 12–15.9)
IMM GRANULOCYTES # BLD AUTO: 0.03 10*3/MM3 (ref 0–0.05)
IMM GRANULOCYTES NFR BLD AUTO: 0.5 % (ref 0–0.5)
LDLC SERPL CALC-MCNC: 81 MG/DL (ref 0–100)
LDLC/HDLC SERPL: 1.1 {RATIO}
LYMPHOCYTES # BLD AUTO: 1.66 10*3/MM3 (ref 0.7–3.1)
LYMPHOCYTES NFR BLD AUTO: 30.3 % (ref 19.6–45.3)
MCH RBC QN AUTO: 31.4 PG (ref 26.6–33)
MCHC RBC AUTO-ENTMCNC: 32.7 G/DL (ref 31.5–35.7)
MCV RBC AUTO: 95.9 FL (ref 79–97)
MONOCYTES # BLD AUTO: 0.53 10*3/MM3 (ref 0.1–0.9)
MONOCYTES NFR BLD AUTO: 9.7 % (ref 5–12)
NEUTROPHILS NFR BLD AUTO: 3.14 10*3/MM3 (ref 1.7–7)
NEUTROPHILS NFR BLD AUTO: 57.3 % (ref 42.7–76)
NRBC BLD AUTO-RTO: 0 /100 WBC (ref 0–0.2)
PLATELET # BLD AUTO: 328 10*3/MM3 (ref 140–450)
PMV BLD AUTO: 10.8 FL (ref 6–12)
POTASSIUM SERPL-SCNC: 4.4 MMOL/L (ref 3.5–5.2)
PROT SERPL-MCNC: 6.8 G/DL (ref 6–8.5)
RBC # BLD AUTO: 4.59 10*6/MM3 (ref 3.77–5.28)
SODIUM SERPL-SCNC: 139 MMOL/L (ref 136–145)
T4 FREE SERPL-MCNC: 1.06 NG/DL (ref 0.92–1.68)
TRIGL SERPL-MCNC: 103 MG/DL (ref 0–150)
TSH SERPL DL<=0.05 MIU/L-ACNC: 4.31 UIU/ML (ref 0.27–4.2)
VLDLC SERPL-MCNC: 18 MG/DL (ref 5–40)
WBC NRBC COR # BLD AUTO: 5.48 10*3/MM3 (ref 3.4–10.8)

## 2024-07-30 PROCEDURE — 80061 LIPID PANEL: CPT

## 2024-07-30 PROCEDURE — 36415 COLL VENOUS BLD VENIPUNCTURE: CPT

## 2024-07-30 PROCEDURE — 84439 ASSAY OF FREE THYROXINE: CPT

## 2024-07-30 PROCEDURE — 83036 HEMOGLOBIN GLYCOSYLATED A1C: CPT

## 2024-07-30 PROCEDURE — 80050 GENERAL HEALTH PANEL: CPT

## 2024-07-31 DIAGNOSIS — E03.9 HYPOTHYROIDISM, UNSPECIFIED TYPE: Primary | ICD-10-CM

## 2024-09-20 ENCOUNTER — TELEPHONE (OUTPATIENT)
Dept: UROLOGY | Facility: CLINIC | Age: 59
End: 2024-09-20
Payer: COMMERCIAL

## 2024-09-23 ENCOUNTER — LAB (OUTPATIENT)
Dept: LAB | Facility: HOSPITAL | Age: 59
End: 2024-09-23
Payer: COMMERCIAL

## 2024-09-23 DIAGNOSIS — E03.9 HYPOTHYROIDISM, UNSPECIFIED TYPE: ICD-10-CM

## 2024-09-23 DIAGNOSIS — E11.65 TYPE 2 DIABETES MELLITUS WITH HYPERGLYCEMIA, WITHOUT LONG-TERM CURRENT USE OF INSULIN: ICD-10-CM

## 2024-09-23 LAB
ALBUMIN UR-MCNC: <1.2 MG/DL
CREAT UR-MCNC: 49.2 MG/DL
MICROALBUMIN/CREAT UR: NORMAL MG/G{CREAT}
T4 FREE SERPL-MCNC: 1.11 NG/DL (ref 0.92–1.68)
TSH SERPL DL<=0.05 MIU/L-ACNC: 3.43 UIU/ML (ref 0.27–4.2)

## 2024-09-23 PROCEDURE — 84439 ASSAY OF FREE THYROXINE: CPT

## 2024-09-23 PROCEDURE — 82570 ASSAY OF URINE CREATININE: CPT

## 2024-09-23 PROCEDURE — 36415 COLL VENOUS BLD VENIPUNCTURE: CPT

## 2024-09-23 PROCEDURE — 82043 UR ALBUMIN QUANTITATIVE: CPT

## 2024-09-23 PROCEDURE — 84443 ASSAY THYROID STIM HORMONE: CPT

## 2024-10-29 ENCOUNTER — TRANSCRIBE ORDERS (OUTPATIENT)
Dept: ADMINISTRATIVE | Facility: HOSPITAL | Age: 59
End: 2024-10-29
Payer: COMMERCIAL

## 2024-10-29 DIAGNOSIS — Z12.31 VISIT FOR SCREENING MAMMOGRAM: Primary | ICD-10-CM

## 2024-12-06 DIAGNOSIS — M54.42 LEFT-SIDED LOW BACK PAIN WITH LEFT-SIDED SCIATICA, UNSPECIFIED CHRONICITY: ICD-10-CM

## 2024-12-06 RX ORDER — TIZANIDINE 2 MG/1
2 TABLET ORAL EVERY 8 HOURS PRN
Qty: 30 TABLET | Refills: 1 | Status: SHIPPED | OUTPATIENT
Start: 2024-12-06

## 2024-12-06 NOTE — TELEPHONE ENCOUNTER
Caller: Damien Cher Natalya    Relationship: Self    Best call back number:     [phone]       Requested Prescriptions:   Requested Prescriptions     Pending Prescriptions Disp Refills    tiZANidine (ZANAFLEX) 2 MG tablet 30 tablet 1     Sig: Take 1 tablet by mouth Every 8 (Eight) Hours As Needed for Muscle Spasms.        [facility] where request should be sent[facility] [id number] - [phone] [facility] [phone] FX     Last office visit with prescribing clinician: 7/25/2024   Last telemedicine visit with prescribing clinician: Visit date not found   Next office visit with prescribing clinician: 1/27/2025     Does the patient have less than a 3 day supply:  [x] Yes  [] No    Would you like a call back once the refill request has been completed: [] Yes [x] No    If the office needs to give you a call back, can they leave a voicemail: [] Yes [x] No    Milton Kyle Rep   12/06/24 09:50 EST

## 2025-01-27 ENCOUNTER — OFFICE VISIT (OUTPATIENT)
Dept: FAMILY MEDICINE CLINIC | Facility: CLINIC | Age: 60
End: 2025-01-27
Payer: COMMERCIAL

## 2025-01-27 VITALS
SYSTOLIC BLOOD PRESSURE: 146 MMHG | DIASTOLIC BLOOD PRESSURE: 66 MMHG | BODY MASS INDEX: 51.91 KG/M2 | HEIGHT: 63 IN | OXYGEN SATURATION: 97 % | WEIGHT: 293 LBS | HEART RATE: 87 BPM

## 2025-01-27 DIAGNOSIS — F33.0 MAJOR DEPRESSIVE DISORDER, RECURRENT, MILD: ICD-10-CM

## 2025-01-27 DIAGNOSIS — E03.9 HYPOTHYROIDISM, UNSPECIFIED TYPE: ICD-10-CM

## 2025-01-27 DIAGNOSIS — M54.42 LEFT-SIDED LOW BACK PAIN WITH LEFT-SIDED SCIATICA, UNSPECIFIED CHRONICITY: ICD-10-CM

## 2025-01-27 DIAGNOSIS — E78.5 HYPERLIPIDEMIA, UNSPECIFIED HYPERLIPIDEMIA TYPE: ICD-10-CM

## 2025-01-27 DIAGNOSIS — E11.65 TYPE 2 DIABETES MELLITUS WITH HYPERGLYCEMIA, WITHOUT LONG-TERM CURRENT USE OF INSULIN: ICD-10-CM

## 2025-01-27 DIAGNOSIS — M79.89 SWELLING OF CLAVICULAR REGION: Primary | ICD-10-CM

## 2025-01-27 PROCEDURE — 99214 OFFICE O/P EST MOD 30 MIN: CPT | Performed by: NURSE PRACTITIONER

## 2025-01-27 RX ORDER — BUPROPION HYDROCHLORIDE 150 MG/1
150 TABLET ORAL DAILY
Qty: 30 TABLET | Refills: 5 | Status: SHIPPED | OUTPATIENT
Start: 2025-01-27

## 2025-01-27 RX ORDER — LEVOTHYROXINE SODIUM 75 UG/1
75 TABLET ORAL DAILY
Qty: 30 TABLET | Refills: 5 | Status: SHIPPED | OUTPATIENT
Start: 2025-01-27

## 2025-01-27 RX ORDER — ROSUVASTATIN CALCIUM 5 MG/1
5 TABLET, COATED ORAL DAILY
Qty: 30 TABLET | Refills: 5 | Status: SHIPPED | OUTPATIENT
Start: 2025-01-27

## 2025-01-27 RX ORDER — ARIPIPRAZOLE 10 MG/1
10 TABLET ORAL DAILY
Qty: 30 TABLET | Refills: 5 | Status: SHIPPED | OUTPATIENT
Start: 2025-01-27

## 2025-01-27 RX ORDER — DULOXETIN HYDROCHLORIDE 60 MG/1
60 CAPSULE, DELAYED RELEASE ORAL DAILY
Qty: 30 CAPSULE | Refills: 5 | Status: SHIPPED | OUTPATIENT
Start: 2025-01-27

## 2025-01-27 NOTE — PROGRESS NOTES
Chief Complaint  Depression, Hypothyroidism, Hyperlipidemia, Diabetes, and Back Pain    SUBJECTIVE  Cher Quezada presents to [facility]     Pt states she would like her clavicle area looked at. States it looks/feels a little swollen, like it is larger on the right than th left. Non-tender     History of Present Illness  Past Medical History:   Diagnosis Date    Anxiety and depression     Arthritis     Bipolar disorder     Cholelithiasis     Chronic allergic rhinitis     Constipation     Hypothyroidism     Migraine     Mood disorder     Obesity     Obstructive sleep apnea       Family History   Problem Relation Age of Onset    Diabetes Mother     Breast cancer Mother 60    Diabetes Father     Sleep apnea Father       Past Surgical History:   Procedure Laterality Date    ANAL FISSURECTOMY      COLON SURGERY      ABDOMINAL    GASTRIC BYPASS  2006    HERNIA REPAIR      ILEOSTOMY REVISION      REVERSAL    RECTAL SURGERY  1994    FISTULA    TUBAL ABDOMINAL LIGATION      URETEROSCOPY LASER LITHOTRIPSY WITH STENT INSERTION Left 9/7/2023    Procedure: URETEROSCOPY LASER LITHOTRIPSY WITH STENT INSERTION;  Surgeon: Ernestine Navarrete MD;  Location: formerly [facility] MA[facility] OR;  Service: Urology;  Laterality: Left;        Current Outpatient Medications:     ARIPiprazole (ABILIFY) 10 MG tablet, Take 1 tablet by mouth Daily., Disp: 30 tablet, Rfl: 5    buPROPion XL (WELLBUTRIN XL) 150 MG 24 hr tablet, Take 1 tablet by mouth Daily., Disp: 30 tablet, Rfl: 5    Calcium-Vitamin D-Vitamin K (VIACTIV PO), Take  by mouth., Disp: , Rfl:     Cholecalciferol 25 MCG (1000 UT) capsule, Take 1 capsule by mouth Daily., Disp: , Rfl:     Cyanocobalamin ER 2000 MCG tablet controlled-release, Take 2,000 mcg by mouth Daily., Disp: , Rfl:     DULoxetine (CYMBALTA) 60 MG capsule, Take 1 capsule by mouth Daily., Disp: 30 capsule, Rfl: 5    levothyroxine (Synthroid) 75 MCG tablet, Take 1 tablet by mouth Daily., Disp: 30  "tablet, Rfl: 5    loratadine (CLARITIN) 10 MG tablet, Take 1 tablet by mouth Daily., Disp: , Rfl:     metFORMIN (GLUCOPHAGE) 500 MG tablet, Take 1 tablet by mouth 2 (Two) Times a Day With Meals., Disp: 60 tablet, Rfl: 5    multivitamin with minerals tablet tablet, Take 1 tablet by mouth Daily., Disp: , Rfl:     rosuvastatin (Crestor) 5 MG tablet, Take 1 tablet by mouth Daily., Disp: 30 tablet, Rfl: 5    tiZANidine (ZANAFLEX) 4 MG tablet, Take 1 tablet by mouth Every 8 (Eight) Hours As Needed for Muscle Spasms., Disp: 30 tablet, Rfl: 1    Zinc Sulfate 220 (50 Zn) MG tablet, Take 50 mg by mouth Daily., Disp: , Rfl:     OBJECTIVE  Vital Signs:   /66   Pulse 87   Ht 160 cm (63\")   Wt (!) 137 kg (302 lb)   SpO2 97%   BMI 53.50 kg/m²    Estimated body mass index is 53.5 kg/m² as calculated from the following:    Height as of this encounter: 160 cm (63\").    Weight as of this encounter: 137 kg (302 lb).     Wt Readings from Last 3 Encounters:   01/27/25 (!) 137 kg (302 lb)   07/25/24 136 kg (300 lb)   05/13/24 134 kg (295 lb)     BP Readings from Last 3 Encounters:   01/27/25 146/66   07/25/24 111/63   05/13/24 143/78       Physical Exam  Vitals reviewed.   Constitutional:       Appearance: Normal appearance. She is well-developed.   HENT:      Head: Normocephalic and atraumatic.      Right Ear: External ear normal.      Left Ear: External ear normal.   Eyes:      Conjunctiva/sclera: Conjunctivae normal.      Pupils: Pupils are equal, round, and reactive to light.   Neck:        Comments: Mildly more prominent/swollen appearance on the right, palpable prominent tissue with no distinct borders, possible lipoma?  Cardiovascular:      Rate and Rhythm: Normal rate and regular rhythm.      Heart sounds: No murmur heard.     No friction rub. No gallop.   Pulmonary:      Effort: Pulmonary effort is normal.      Breath sounds: Normal breath sounds. No wheezing or rhonchi.   Skin:     General: Skin is warm and dry. "   Neurological:      Mental Status: She is alert and oriented to person, place, and time.      Cranial Nerves: No cranial nerve deficit.   Psychiatric:         Mood and Affect: Mood and affect normal.         Behavior: Behavior normal.         Thought Content: Thought content normal.         Judgment: Judgment normal.          Result Review    CMP          7/30/2024    09:04   CMP   Glucose 122    BUN 8    Creatinine 0.99    EGFR 66.6    Sodium 139    Potassium 4.4    Chloride 105    Calcium 9.5    Total Protein 6.8    Albumin 3.9    Globulin 2.9    Total Bilirubin 0.2    Alkaline Phosphatase 119    AST (SGOT) 22    ALT (SGPT) 26    Albumin/Globulin Ratio 1.3    BUN/Creatinine Ratio 8.1    Anion Gap 13.2      CBC          7/30/2024    09:04   CBC   WBC 5.48    RBC 4.59    Hemoglobin 14.4    Hematocrit 44.0    MCV 95.9    MCH 31.4    MCHC 32.7    RDW 11.8    Platelets 328      Lipid Panel          7/30/2024    09:04   Lipid Panel   Total Cholesterol 170    Triglycerides 103    HDL Cholesterol 71    VLDL Cholesterol 18    LDL Cholesterol  81    LDL/HDL Ratio 1.10      TSH          7/30/2024    09:04 9/23/2024    13:18   TSH   TSH 4.310  3.430      Most Recent A1C          7/30/2024    09:04   HGBA1C Most Recent   Hemoglobin A1C 6.20        No Images in the past 120 days found..     The above data has been reviewed by DESMOND Haro 01/27/2025 14:37 EST.          Patient Care Team:  Theresa Chacon APRN as PCP - General (Nurse Practitioner)  Ryan Corbett MD as Consulting Physician (General Surgery)  Ernestine Navarrete MD as Consulting Physician (Urology)            ASSESSMENT & PLAN    Diagnoses and all orders for this visit:    1. Swelling of clavicular region (Primary)  -     US Head Neck Soft Tissue; Future    2. Left-sided low back pain with left-sided sciatica, unspecified chronicity  Comments:  Patient declines imaging or referral at present, will follow-up if no improvement with medication,  side effects and admin discussed  Orders:  -     tiZANidine (ZANAFLEX) 4 MG tablet; Take 1 tablet by mouth Every 8 (Eight) Hours As Needed for Muscle Spasms.  Dispense: 30 tablet; Refill: 1    3. Hyperlipidemia, unspecified hyperlipidemia type  Overview:  Stable on Crestor, continue current medication    Orders:  -     rosuvastatin (Crestor) 5 MG tablet; Take 1 tablet by mouth Daily.  Dispense: 30 tablet; Refill: 5    4. Type 2 diabetes mellitus with hyperglycemia, without long-term current use of insulin  Overview:  Stable and well-controlled, continue current medication    Orders:  -     Comprehensive Metabolic Panel; Future  -     CBC & Differential; Future  -     Lipid Panel; Future  -     TSH Rfx On Abnormal To Free T4; Future  -     Hemoglobin A1c; Future  -     metFORMIN (GLUCOPHAGE) 500 MG tablet; Take 1 tablet by mouth 2 (Two) Times a Day With Meals.  Dispense: 60 tablet; Refill: 5  -     Microalbumin / Creatinine Urine Ratio - Urine, Clean Catch; Future    5. Hypothyroidism, unspecified type  Overview:  Stable on levothyroxine, continue current dose    Orders:  -     levothyroxine (Synthroid) 75 MCG tablet; Take 1 tablet by mouth Daily.  Dispense: 30 tablet; Refill: 5    6. Major depressive disorder, recurrent, mild  Overview:  Stable on current, continue current dose    Orders:  -     DULoxetine (CYMBALTA) 60 MG capsule; Take 1 capsule by mouth Daily.  Dispense: 30 capsule; Refill: 5  -     buPROPion XL (WELLBUTRIN XL) 150 MG 24 hr tablet; Take 1 tablet by mouth Daily.  Dispense: 30 tablet; Refill: 5  -     ARIPiprazole (ABILIFY) 10 MG tablet; Take 1 tablet by mouth Daily.  Dispense: 30 tablet; Refill: 5         Tobacco Use: Low Risk  (1/27/2025)    Patient History     Smoking Tobacco Use: Never     Smokeless Tobacco Use: Never     Passive Exposure: Not on file       Follow Up     Return in about 6 months (around 7/27/2025), or if symptoms worsen or fail to improve.        Patient was given instructions  and counseling regarding her condition or for health maintenance advice. Please see specific information pulled into the AVS if appropriate.   I have reviewed information obtained and documented by others and I have confirmed the accuracy of this documented note.    DESMOND Haro

## 2025-02-17 ENCOUNTER — HOSPITAL ENCOUNTER (OUTPATIENT)
Dept: ULTRASOUND IMAGING | Facility: HOSPITAL | Age: 60
Discharge: HOME | End: 2025-02-17
Admitting: NURSE PRACTITIONER
Payer: COMMERCIAL

## 2025-02-17 DIAGNOSIS — M79.89 SWELLING OF CLAVICULAR REGION: ICD-10-CM

## 2025-02-17 PROCEDURE — 76536 US EXAM OF HEAD AND NECK: CPT

## 2025-02-20 DIAGNOSIS — M79.89 SWELLING OF CLAVICULAR REGION: Primary | ICD-10-CM

## 2025-02-24 ENCOUNTER — LAB (OUTPATIENT)
Dept: LAB | Facility: HOSPITAL | Age: 60
End: 2025-02-24
Payer: COMMERCIAL

## 2025-02-24 DIAGNOSIS — E11.65 TYPE 2 DIABETES MELLITUS WITH HYPERGLYCEMIA, WITHOUT LONG-TERM CURRENT USE OF INSULIN: ICD-10-CM

## 2025-02-24 LAB
ALBUMIN SERPL-MCNC: 3.3 G/DL (ref 3.5–5.2)
ALBUMIN UR-MCNC: 6.5 MG/DL
ALBUMIN/GLOB SERPL: 1 G/DL
ALP SERPL-CCNC: 106 U/L (ref 39–117)
ALT SERPL W P-5'-P-CCNC: 22 U/L (ref 1–33)
ANION GAP SERPL CALCULATED.3IONS-SCNC: 12.2 MMOL/L (ref 5–15)
AST SERPL-CCNC: 19 U/L (ref 1–32)
BASOPHILS # BLD AUTO: 0.04 10*3/MM3 (ref 0–0.2)
BASOPHILS NFR BLD AUTO: 0.7 % (ref 0–1.5)
BILIRUB SERPL-MCNC: 0.3 MG/DL (ref 0–1.2)
BUN SERPL-MCNC: 7 MG/DL (ref 6–20)
BUN/CREAT SERPL: 6.9 (ref 7–25)
CALCIUM SPEC-SCNC: 9.7 MG/DL (ref 8.6–10.5)
CHLORIDE SERPL-SCNC: 104 MMOL/L (ref 98–107)
CHOLEST SERPL-MCNC: 180 MG/DL (ref 0–200)
CO2 SERPL-SCNC: 22.8 MMOL/L (ref 22–29)
CREAT SERPL-MCNC: 1.01 MG/DL (ref 0.57–1)
CREAT UR-MCNC: 407.2 MG/DL
DEPRECATED RDW RBC AUTO: 42 FL (ref 37–54)
EGFRCR SERPLBLD CKD-EPI 2021: 64.7 ML/MIN/1.73
EOSINOPHIL # BLD AUTO: 0.1 10*3/MM3 (ref 0–0.4)
EOSINOPHIL NFR BLD AUTO: 1.7 % (ref 0.3–6.2)
ERYTHROCYTE [DISTWIDTH] IN BLOOD BY AUTOMATED COUNT: 11.8 % (ref 12.3–15.4)
GLOBULIN UR ELPH-MCNC: 3.4 GM/DL
GLUCOSE SERPL-MCNC: 140 MG/DL (ref 65–99)
HBA1C MFR BLD: 6.1 % (ref 4.8–5.6)
HCT VFR BLD AUTO: 43.7 % (ref 34–46.6)
HDLC SERPL-MCNC: 71 MG/DL (ref 40–60)
HGB BLD-MCNC: 14.1 G/DL (ref 12–15.9)
IMM GRANULOCYTES # BLD AUTO: 0.02 10*3/MM3 (ref 0–0.05)
IMM GRANULOCYTES NFR BLD AUTO: 0.3 % (ref 0–0.5)
LDLC SERPL CALC-MCNC: 90 MG/DL (ref 0–100)
LDLC/HDLC SERPL: 1.24 {RATIO}
LYMPHOCYTES # BLD AUTO: 1.8 10*3/MM3 (ref 0.7–3.1)
LYMPHOCYTES NFR BLD AUTO: 30.7 % (ref 19.6–45.3)
MCH RBC QN AUTO: 31.1 PG (ref 26.6–33)
MCHC RBC AUTO-ENTMCNC: 32.3 G/DL (ref 31.5–35.7)
MCV RBC AUTO: 96.5 FL (ref 79–97)
MICROALBUMIN/CREAT UR: 16 MG/G (ref 0–29)
MONOCYTES # BLD AUTO: 0.5 10*3/MM3 (ref 0.1–0.9)
MONOCYTES NFR BLD AUTO: 8.5 % (ref 5–12)
NEUTROPHILS NFR BLD AUTO: 3.4 10*3/MM3 (ref 1.7–7)
NEUTROPHILS NFR BLD AUTO: 58.1 % (ref 42.7–76)
NRBC BLD AUTO-RTO: 0 /100 WBC (ref 0–0.2)
PLATELET # BLD AUTO: 315 10*3/MM3 (ref 140–450)
PMV BLD AUTO: 10.6 FL (ref 6–12)
POTASSIUM SERPL-SCNC: 4.4 MMOL/L (ref 3.5–5.2)
PROT SERPL-MCNC: 6.7 G/DL (ref 6–8.5)
RBC # BLD AUTO: 4.53 10*6/MM3 (ref 3.77–5.28)
SODIUM SERPL-SCNC: 139 MMOL/L (ref 136–145)
TRIGL SERPL-MCNC: 105 MG/DL (ref 0–150)
TSH SERPL DL<=0.05 MIU/L-ACNC: 2.51 UIU/ML (ref 0.27–4.2)
VLDLC SERPL-MCNC: 19 MG/DL (ref 5–40)
WBC NRBC COR # BLD AUTO: 5.86 10*3/MM3 (ref 3.4–10.8)

## 2025-02-24 PROCEDURE — 80050 GENERAL HEALTH PANEL: CPT

## 2025-02-24 PROCEDURE — 82043 UR ALBUMIN QUANTITATIVE: CPT

## 2025-02-24 PROCEDURE — 36415 COLL VENOUS BLD VENIPUNCTURE: CPT

## 2025-02-24 PROCEDURE — 82570 ASSAY OF URINE CREATININE: CPT

## 2025-02-24 PROCEDURE — 83036 HEMOGLOBIN GLYCOSYLATED A1C: CPT

## 2025-02-24 PROCEDURE — 80061 LIPID PANEL: CPT

## 2025-03-05 ENCOUNTER — TELEPHONE (OUTPATIENT)
Dept: FAMILY MEDICINE CLINIC | Facility: CLINIC | Age: 60
End: 2025-03-05
Payer: COMMERCIAL

## 2025-03-05 NOTE — TELEPHONE ENCOUNTER
Caller: Cher Quezada    Relationship: Self    Best call back number: [phone]    What medication are you requesting: SOMETHING TO HELP WITH SLEEP     What are your current symptoms: HAVING TROUBLE FALLING ASLEEP AND STAYING ASLEEP     How long have you been experiencing symptoms: A FEW DAYS     Have you had these symptoms before:    [x] Yes  [] No    Have you been treated for these symptoms before:   [] Yes  [x] No    If a prescription is needed, what is your preferred pharmacy and phone number: [facility] [place] - [address] - [phone]  - [phone] FX

## 2025-03-06 NOTE — TELEPHONE ENCOUNTER
Patient informed and voiced understanding. Patient states she will review work schedule and call back for an appt.

## 2025-03-21 ENCOUNTER — HOSPITAL ENCOUNTER (OUTPATIENT)
Dept: MAMMOGRAPHY | Facility: HOSPITAL | Age: 60
Discharge: HOME | End: 2025-03-21
Payer: COMMERCIAL

## 2025-03-21 DIAGNOSIS — Z12.31 VISIT FOR SCREENING MAMMOGRAM: ICD-10-CM

## 2025-04-08 ENCOUNTER — HOSPITAL ENCOUNTER (OUTPATIENT)
Dept: MAMMOGRAPHY | Facility: HOSPITAL | Age: 60
Discharge: HOME | End: 2025-04-08
Admitting: OBSTETRICS & GYNECOLOGY
Payer: COMMERCIAL

## 2025-04-08 PROCEDURE — 77063 BREAST TOMOSYNTHESIS BI: CPT

## 2025-04-08 PROCEDURE — 77067 SCR MAMMO BI INCL CAD: CPT

## 2025-04-10 ENCOUNTER — TELEPHONE (OUTPATIENT)
Dept: FAMILY MEDICINE CLINIC | Facility: CLINIC | Age: 60
End: 2025-04-10
Payer: COMMERCIAL

## 2025-04-10 DIAGNOSIS — R92.8 ABNORMAL SCREENING MAMMOGRAM: Primary | ICD-10-CM

## 2025-04-10 NOTE — TELEPHONE ENCOUNTER
Screening mammo completed 04/08/25 ordered by Dr Villa. Pt is needing orders for additional imaging and would like PCP to order.     1. Left breast: Need additional imaging. Recommend left diagnostic  mammogram and left breast ultrasound.     2. Right breast: Benign mammogram.

## 2025-04-10 NOTE — TELEPHONE ENCOUNTER
Caller: Cher Quezada    Relationship: Self    Best call back number: [phone]     Caller requesting test results: YES    What test was performed: MAMMOGRAM    When was the test performed: 04.08.2025      Additional notes: PATIENT WANTING TO DISCUSS RESULTS WITH CLINICAL STAFF.

## 2025-05-05 ENCOUNTER — HOSPITAL ENCOUNTER (OUTPATIENT)
Dept: MAMMOGRAPHY | Facility: HOSPITAL | Age: 60
Discharge: HOME | End: 2025-05-05
Payer: COMMERCIAL

## 2025-05-05 ENCOUNTER — HOSPITAL ENCOUNTER (OUTPATIENT)
Dept: ULTRASOUND IMAGING | Facility: HOSPITAL | Age: 60
Discharge: HOME | End: 2025-05-05
Payer: COMMERCIAL

## 2025-05-05 DIAGNOSIS — R92.8 ABNORMAL SCREENING MAMMOGRAM: ICD-10-CM

## 2025-05-05 PROCEDURE — G0279 TOMOSYNTHESIS, MAMMO: HCPCS

## 2025-05-05 PROCEDURE — 77065 DX MAMMO INCL CAD UNI: CPT

## 2025-05-05 PROCEDURE — 76642 ULTRASOUND BREAST LIMITED: CPT

## 2025-05-13 ENCOUNTER — HOSPITAL ENCOUNTER (OUTPATIENT)
Dept: MAMMOGRAPHY | Facility: HOSPITAL | Age: 60
Discharge: HOME | End: 2025-05-13
Payer: COMMERCIAL

## 2025-05-13 ENCOUNTER — PATIENT OUTREACH (OUTPATIENT)
Dept: ONCOLOGY | Facility: HOSPITAL | Age: 60
End: 2025-05-13
Payer: COMMERCIAL

## 2025-05-13 ENCOUNTER — RESULTS FOLLOW-UP (OUTPATIENT)
Dept: MAMMOGRAPHY | Facility: HOSPITAL | Age: 60
End: 2025-05-13
Payer: COMMERCIAL

## 2025-05-13 DIAGNOSIS — N63.42 SUBAREOLAR MASS OF LEFT BREAST: ICD-10-CM

## 2025-05-13 PROCEDURE — A4648 IMPLANTABLE TISSUE MARKER: HCPCS

## 2025-05-13 PROCEDURE — C1819 TISSUE LOCALIZATION-EXCISION: HCPCS

## 2025-05-13 PROCEDURE — 25010000002 LIDOCAINE 1% - EPINEPHRINE 1:100000 1 %-1:100000 SOLUTION

## 2025-05-13 PROCEDURE — 88305 TISSUE EXAM BY PATHOLOGIST: CPT | Performed by: RADIOLOGY

## 2025-05-13 PROCEDURE — 25010000002 LIDOCAINE 1 % SOLUTION

## 2025-05-13 PROCEDURE — 76098 X-RAY EXAM SURGICAL SPECIMEN: CPT

## 2025-05-13 RX ORDER — LIDOCAINE HYDROCHLORIDE 10 MG/ML
INJECTION, SOLUTION INFILTRATION; PERINEURAL
Status: COMPLETED
Start: 2025-05-13 | End: 2025-05-13

## 2025-05-13 RX ORDER — LIDOCAINE HYDROCHLORIDE AND EPINEPHRINE 10; 10 MG/ML; UG/ML
INJECTION, SOLUTION INFILTRATION; PERINEURAL
Status: COMPLETED
Start: 2025-05-13 | End: 2025-05-13

## 2025-05-13 RX ORDER — DIAPER,BRIEF,INFANT-TODD,DISP
EACH MISCELLANEOUS
Status: COMPLETED
Start: 2025-05-13 | End: 2025-05-13

## 2025-05-13 RX ADMIN — BACITRACIN: 500 OINTMENT TOPICAL at 10:36

## 2025-05-13 RX ADMIN — LIDOCAINE HYDROCHLORIDE: 10 INJECTION, SOLUTION INFILTRATION; PERINEURAL at 10:36

## 2025-05-13 RX ADMIN — LIDOCAINE HYDROCHLORIDE,EPINEPHRINE BITARTRATE: 10; .01 INJECTION, SOLUTION INFILTRATION; PERINEURAL at 10:36

## 2025-05-14 DIAGNOSIS — M54.42 LEFT-SIDED LOW BACK PAIN WITH LEFT-SIDED SCIATICA, UNSPECIFIED CHRONICITY: ICD-10-CM

## 2025-05-14 LAB
CYTO UR: NORMAL
LAB AP CASE REPORT: NORMAL
LAB AP CLINICAL INFORMATION: NORMAL
PATH REPORT.FINAL DX SPEC: NORMAL
PATH REPORT.GROSS SPEC: NORMAL

## 2025-05-14 NOTE — TELEPHONE ENCOUNTER
Caller: Damien Cher Hoang    Relationship: Self    Best call back number: [phone]    Requested Prescriptions:   Requested Prescriptions     Pending Prescriptions Disp Refills    tiZANidine (ZANAFLEX) 4 MG tablet 30 tablet 1     Sig: Take 1 tablet by mouth Every 8 (Eight) Hours As Needed for Muscle Spasms.        [facility] [address] - [phone] [facility] [phone]      Last office visit with prescribing clinician: 1/27/2025   Last telemedicine visit with prescribing clinician: Visit date not found   Next office visit with prescribing clinician: 7/29/2025     Additional details provided by patient: PATIENT IS NEEDING TO PICK THIS UP AS SOON AS POSSIBLE AS SHE IS ALMOST OUT OF THIS CURRENTLY.     Does the patient have less than a 3 day supply:  [x] Yes  [] No    Would you like a call back once the refill request has been completed: [] Yes [] No    If the office needs to give you a call back, can they leave a voicemail: [] Yes [] No    Milton Yanez Rep   05/14/25 14:37 EDT

## 2025-07-28 DIAGNOSIS — F33.0 MAJOR DEPRESSIVE DISORDER, RECURRENT, MILD: ICD-10-CM

## 2025-07-28 DIAGNOSIS — E11.65 TYPE 2 DIABETES MELLITUS WITH HYPERGLYCEMIA, WITHOUT LONG-TERM CURRENT USE OF INSULIN: ICD-10-CM

## 2025-07-28 DIAGNOSIS — E78.5 HYPERLIPIDEMIA, UNSPECIFIED HYPERLIPIDEMIA TYPE: ICD-10-CM

## 2025-07-29 ENCOUNTER — OFFICE VISIT (OUTPATIENT)
Dept: FAMILY MEDICINE CLINIC | Facility: CLINIC | Age: 60
End: 2025-07-29
Payer: COMMERCIAL

## 2025-07-29 VITALS
SYSTOLIC BLOOD PRESSURE: 151 MMHG | HEART RATE: 93 BPM | HEIGHT: 63 IN | OXYGEN SATURATION: 97 % | DIASTOLIC BLOOD PRESSURE: 62 MMHG | BODY MASS INDEX: 51.91 KG/M2 | WEIGHT: 293 LBS

## 2025-07-29 DIAGNOSIS — E78.5 HYPERLIPIDEMIA, UNSPECIFIED HYPERLIPIDEMIA TYPE: ICD-10-CM

## 2025-07-29 DIAGNOSIS — M54.42 LEFT-SIDED LOW BACK PAIN WITH LEFT-SIDED SCIATICA, UNSPECIFIED CHRONICITY: ICD-10-CM

## 2025-07-29 DIAGNOSIS — Z53.20 COLON CANCER SCREENING DECLINED: ICD-10-CM

## 2025-07-29 DIAGNOSIS — E03.9 HYPOTHYROIDISM, UNSPECIFIED TYPE: ICD-10-CM

## 2025-07-29 DIAGNOSIS — F33.0 MAJOR DEPRESSIVE DISORDER, RECURRENT, MILD: ICD-10-CM

## 2025-07-29 DIAGNOSIS — F41.9 ANXIETY: ICD-10-CM

## 2025-07-29 DIAGNOSIS — R25.2 LEG CRAMP: Primary | ICD-10-CM

## 2025-07-29 DIAGNOSIS — I10 PRIMARY HYPERTENSION: ICD-10-CM

## 2025-07-29 DIAGNOSIS — E55.9 VITAMIN D DEFICIENCY: ICD-10-CM

## 2025-07-29 DIAGNOSIS — E11.65 TYPE 2 DIABETES MELLITUS WITH HYPERGLYCEMIA, WITHOUT LONG-TERM CURRENT USE OF INSULIN: ICD-10-CM

## 2025-07-29 PROBLEM — R73.03 PREDIABETES: Status: RESOLVED | Noted: 2023-02-06 | Resolved: 2025-07-29

## 2025-07-29 PROCEDURE — 99214 OFFICE O/P EST MOD 30 MIN: CPT | Performed by: NURSE PRACTITIONER

## 2025-07-29 RX ORDER — BUPROPION HYDROCHLORIDE 150 MG/1
150 TABLET ORAL DAILY
Qty: 30 TABLET | Refills: 5 | OUTPATIENT
Start: 2025-07-29

## 2025-07-29 RX ORDER — LEVOTHYROXINE SODIUM 75 UG/1
75 TABLET ORAL DAILY
Qty: 30 TABLET | Refills: 5 | Status: SHIPPED | OUTPATIENT
Start: 2025-07-29

## 2025-07-29 RX ORDER — DULOXETIN HYDROCHLORIDE 60 MG/1
60 CAPSULE, DELAYED RELEASE ORAL DAILY
Qty: 30 CAPSULE | Refills: 5 | Status: SHIPPED | OUTPATIENT
Start: 2025-07-29

## 2025-07-29 RX ORDER — ROSUVASTATIN CALCIUM 5 MG/1
5 TABLET, COATED ORAL DAILY
Qty: 30 TABLET | Refills: 5 | OUTPATIENT
Start: 2025-07-29

## 2025-07-29 RX ORDER — ROSUVASTATIN CALCIUM 5 MG/1
5 TABLET, COATED ORAL DAILY
Qty: 30 TABLET | Refills: 5 | Status: SHIPPED | OUTPATIENT
Start: 2025-07-29

## 2025-07-29 RX ORDER — ARIPIPRAZOLE 10 MG/1
10 TABLET ORAL DAILY
Qty: 30 TABLET | Refills: 5 | OUTPATIENT
Start: 2025-07-29

## 2025-07-29 RX ORDER — HYDROXYZINE HYDROCHLORIDE 25 MG/1
TABLET, FILM COATED ORAL
Qty: 30 TABLET | Refills: 0 | Status: SHIPPED | OUTPATIENT
Start: 2025-07-29

## 2025-07-29 RX ORDER — BUPROPION HYDROCHLORIDE 150 MG/1
150 TABLET ORAL DAILY
Qty: 30 TABLET | Refills: 5 | Status: SHIPPED | OUTPATIENT
Start: 2025-07-29

## 2025-07-29 RX ORDER — LISINOPRIL 10 MG/1
10 TABLET ORAL DAILY
Qty: 90 TABLET | Refills: 0 | Status: SHIPPED | OUTPATIENT
Start: 2025-07-29

## 2025-07-29 RX ORDER — DULOXETIN HYDROCHLORIDE 60 MG/1
60 CAPSULE, DELAYED RELEASE ORAL DAILY
Qty: 30 CAPSULE | Refills: 5 | OUTPATIENT
Start: 2025-07-29

## 2025-07-29 RX ORDER — ARIPIPRAZOLE 10 MG/1
10 TABLET ORAL DAILY
Qty: 30 TABLET | Refills: 5 | Status: SHIPPED | OUTPATIENT
Start: 2025-07-29

## 2025-07-29 NOTE — ASSESSMENT & PLAN NOTE
Discussed with patient blood pressure has been elevated last couple of checks, after discussion we have decided to go ahead and start low-dose lisinopril, side effects administration medication discussed, we will follow-up in 3 months for reevaluation, patient to monitor her blood pressure at home.  Goal 130/80 or less, if blood pressure not improving please let me know

## 2025-07-29 NOTE — PROGRESS NOTES
Chief Complaint  Depression, Hypothyroidism, Hyperlipidemia, Hypertension, Diabetes, Vitamin D Deficiency, and Anxiety    SUBJECTIVE  Cher Quezada presents to [facility] for six month follow up on Depression, Hypothyroidism, Hyperlipidemia, Hypertension, Diabetes, and Vitamin D Deficiency.     Pt would like to discuss options for anxiety.  Patient reports that her  has been diagnosed with some medical issues and has been having some anxiety related to this.  Also has some anxiety when she has to drive because he typically does this    Patient complaining of leg cramps at night, like a charley horse, this is occasional, has taken the Zanaflex for this, sometimes it seems to help and sometimes it does not    History of Present Illness  Past Medical History:   Diagnosis Date    Anxiety and depression     Arthritis     Bipolar disorder     Cholelithiasis     Chronic allergic rhinitis     Constipation     Hypothyroidism     Migraine     Mood disorder     Obesity     Obstructive sleep apnea       Family History   Problem Relation Age of Onset    Diabetes Mother     Breast cancer Mother 60    Diabetes Father     Sleep apnea Father       Past Surgical History:   Procedure Laterality Date    ANAL FISSURECTOMY      COLON SURGERY      ABDOMINAL    GASTRIC BYPASS  2006    HERNIA REPAIR      ILEOSTOMY REVISION      REVERSAL    RECTAL SURGERY  1994    FISTULA    TUBAL ABDOMINAL LIGATION      URETEROSCOPY LASER LITHOTRIPSY WITH STENT INSERTION Left 9/7/2023    Procedure: URETEROSCOPY LASER LITHOTRIPSY WITH STENT INSERTION;  Surgeon: Ernestine Navarrete MD;  Location: [facility];  Service: Urology;  Laterality: Left;        Current Outpatient Medications:     ARIPiprazole (ABILIFY) 10 MG tablet, Take 1 tablet by mouth Daily., Disp: 30 tablet, Rfl: 5    buPROPion XL (WELLBUTRIN XL) 150 MG 24 hr tablet, Take 1 tablet by mouth Daily., Disp: 30 tablet, Rfl: 5    Calcium-Vitamin  "D-Vitamin K (VIACTIV PO), Take  by mouth., Disp: , Rfl:     Cholecalciferol 25 MCG (1000 UT) capsule, Take 1 capsule by mouth Daily., Disp: , Rfl:     Cyanocobalamin ER 2000 MCG tablet controlled-release, Take 2,000 mcg by mouth Daily., Disp: , Rfl:     DULoxetine (CYMBALTA) 60 MG capsule, Take 1 capsule by mouth Daily., Disp: 30 capsule, Rfl: 5    levothyroxine (Synthroid) 75 MCG tablet, Take 1 tablet by mouth Daily., Disp: 30 tablet, Rfl: 5    loratadine (CLARITIN) 10 MG tablet, Take 1 tablet by mouth Daily., Disp: , Rfl:     metFORMIN (GLUCOPHAGE) 500 MG tablet, Take 1 tablet by mouth 2 (Two) Times a Day With Meals., Disp: 60 tablet, Rfl: 5    multivitamin with minerals tablet tablet, Take 1 tablet by mouth Daily., Disp: , Rfl:     rosuvastatin (Crestor) 5 MG tablet, Take 1 tablet by mouth Daily., Disp: 30 tablet, Rfl: 5    tiZANidine (ZANAFLEX) 4 MG tablet, Take 1 tablet by mouth Every 8 (Eight) Hours As Needed for Muscle Spasms., Disp: 30 tablet, Rfl: 1    Zinc Sulfate 220 (50 Zn) MG tablet, Take 50 mg by mouth Daily., Disp: , Rfl:     hydrOXYzine (ATARAX) 25 MG tablet, 1/2 to 1 tab PO  QD PRN, Disp: 30 tablet, Rfl: 0    lisinopril (PRINIVIL,ZESTRIL) 10 MG tablet, Take 1 tablet by mouth Daily., Disp: 90 tablet, Rfl: 0    OBJECTIVE  Vital Signs:   /62   Pulse 93   Ht 160 cm (63\")   Wt 135 kg (297 lb)   SpO2 97%   BMI 52.61 kg/m²    Estimated body mass index is 52.61 kg/m² as calculated from the following:    Height as of this encounter: 160 cm (63\").    Weight as of this encounter: 135 kg (297 lb).     Wt Readings from Last 3 Encounters:   07/29/25 135 kg (297 lb)   01/27/25 (!) 137 kg (302 lb)   07/25/24 136 kg (300 lb)     BP Readings from Last 3 Encounters:   07/29/25 151/62   01/27/25 146/66   07/25/24 111/63       Physical Exam  Vitals reviewed.   Constitutional:       Appearance: Normal appearance. She is well-developed.   HENT:      Head: Normocephalic and atraumatic.      Right Ear: External " "ear normal.      Left Ear: External ear normal.   Eyes:      Conjunctiva/sclera: Conjunctivae normal.      Pupils: Pupils are equal, round, and reactive to light.   Cardiovascular:      Rate and Rhythm: Normal rate and regular rhythm.      Heart sounds: No murmur heard.     No friction rub. No gallop.   Pulmonary:      Effort: Pulmonary effort is normal.      Breath sounds: Normal breath sounds. No wheezing or rhonchi.   Skin:     General: Skin is warm and dry.   Neurological:      Mental Status: She is alert and oriented to person, place, and time.      Cranial Nerves: No cranial nerve deficit.   Psychiatric:         Mood and Affect: Mood and affect normal.         Behavior: Behavior normal.         Thought Content: Thought content normal.         Judgment: Judgment normal.          Result Review    CMP          2/24/2025    08:20   CMP   Glucose 140    BUN 7    Creatinine 1.01    EGFR 64.7    Sodium 139    Potassium 4.4    Chloride 104    Calcium 9.7    Total Protein 6.7    Albumin 3.3    Globulin 3.4    Total Bilirubin 0.3    Alkaline Phosphatase 106    AST (SGOT) 19    ALT (SGPT) 22    Albumin/Globulin Ratio 1.0    BUN/Creatinine Ratio 6.9    Anion Gap 12.2      CBC          2/24/2025    08:20   CBC   WBC 5.86    RBC 4.53    Hemoglobin 14.1    Hematocrit 43.7    MCV 96.5    MCH 31.1    MCHC 32.3    RDW 11.8    Platelets 315      Lipid Panel          2/24/2025    08:20   Lipid Panel   Total Cholesterol 180    Triglycerides 105    HDL Cholesterol 71    VLDL Cholesterol 19    LDL Cholesterol  90    LDL/HDL Ratio 1.24      TSH          9/23/2024    13:18 2/24/2025    08:20   TSH   TSH 3.430  2.510      Most Recent A1C          2/24/2025    08:20   HGBA1C Most Recent   Hemoglobin A1C 6.10      No components found for: \"IZJZ32ZB\"  No results found for: \"TESTOSTERONE\"  No components found for: \"FREET4\"    Mammo Stereotactic Breast Biopsy Initial With & Without Device  Addendum Date: 5/14/2025  Pathology result is " available and shows the following: - Hematoma - Fat necrosis  This benign result is concordant with imaging findings. Recommend annual screening mammography.    5/14/2025 1:39 PM by Radha Abda MD on Workstation: [email or web address]      Result Date: 5/14/2025  Technically successful stereotactic biopsy of the left breast mass, with the biopsy clip in expected position. After final pathology has been reviewed, an addendum will be dictated for concordance and further recommendations.  5/13/2025 12:51 PM by Radha Abad MD on Workstation: [email or web address]      Mammo Post Device Placement Left  Addendum Date: 5/14/2025  Pathology result is available and shows the following: - Hematoma - Fat necrosis  This benign result is concordant with imaging findings. Recommend annual screening mammography.    5/14/2025 1:39 PM by Radha Abad MD on Workstation: [email or web address]      Result Date: 5/14/2025  Technically successful stereotactic biopsy of the left breast mass, with the biopsy clip in expected position. After final pathology has been reviewed, an addendum will be dictated for concordance and further recommendations.  5/13/2025 12:51 PM by Radha Abad MD on Workstation: [email or web address]      Mammo Stereotactic Breast Specimen Left  Addendum Date: 5/14/2025  Pathology result is available and shows the following: - Hematoma - Fat necrosis  This benign result is concordant with imaging findings. Recommend annual screening mammography.    5/14/2025 1:39 PM by Radha Abad MD on Workstation: [email or web address]      Result Date: 5/14/2025  Technically successful stereotactic biopsy of the left breast mass, with the biopsy clip in expected position. After final pathology has been reviewed, an addendum will be dictated for concordance and further recommendations.  5/13/2025 12:51 PM by Radha Abad MD on Workstation: [email or web address]      Mammo Diagnostic Digital Tomosynthesis Left With CAD  Result Date: 5/5/2025   IMPRESSION: Diagnostic left mammogram and targeted  left breast ultrasound demonstrating suspicious 8 mm nodule at 12:00.  I met with the ultrasound technologist and patient and discussed findings and recommendations. The patient was set up to be scheduled for a stereotactic biopsy of the left breast.    TISSUE DENSITY: There are scattered areas of fibroglandular density.  BI-RADS ASSESSMENT: BI-RADS 4. Suspicious abnormality.   Note: It has been reported that there is approximately a 15% false negative in mammography. Therefore, management of a palpable abnormality should not be deferred because of a negative mammogram.    5/5/2025 11:39 AM by CHRIS BROWNLEE MD on Workstation: [email or web address]      US Breast Left Limited  Result Date: 5/5/2025   IMPRESSION: Diagnostic left mammogram and targeted left breast ultrasound demonstrating suspicious 8 mm nodule at 12:00.  I met with the ultrasound technologist and patient and discussed findings and recommendations. The patient was set up to be scheduled for a stereotactic biopsy of the left breast.    TISSUE DENSITY: There are scattered areas of fibroglandular density.  BI-RADS ASSESSMENT: BI-RADS 4. Suspicious abnormality.   Note: It has been reported that there is approximately a 15% false negative in mammography. Therefore, management of a palpable abnormality should not be deferred because of a negative mammogram.    5/5/2025 11:39 AM by CHRIS BROWNLEE MD on Workstation: [email or web address]      Mammo Screening Digital Tomosynthesis Bilateral With CAD  Result Date: 4/8/2025   1. Left breast: Need additional imaging. Recommend left diagnostic mammogram and left breast ultrasound.  2. Right breast: Benign mammogram.  BI-RADS ASSESSMENT: BI-RADS 0. Incomplete: Need Additional Imaging Evaluation.  Note:  It has been reported that there is approximately a 15% false negative rate in mammography.  Therefore, management of a palpable abnormality should not be deferred because of a negative mammogram.  4/8/2025 3:33 PM by LONNIE SMITH MD on  Workstation: [email or web address]         The above data has been reviewed by DESMOND Haro 07/29/2025 13:52 EDT.          Patient Care Team:  Theresa Chacon APRN as PCP - General (Nurse Practitioner)  Ryan Corbett MD as Consulting Physician (General Surgery)  Ernestine Navarrete MD as Consulting Physician (Urology)         ASSESSMENT & PLAN    Diagnoses and all orders for this visit:    1. Leg cramp (Primary)  Comments:  Will check labs, recommend stretching  Orders:  -     Magnesium; Future  -     Vitamin D 25 hydroxy; Future    2. Major depressive disorder, recurrent, mild  Overview:  Stable on current, continue current dose    Orders:  -     ARIPiprazole (ABILIFY) 10 MG tablet; Take 1 tablet by mouth Daily.  Dispense: 30 tablet; Refill: 5  -     buPROPion XL (WELLBUTRIN XL) 150 MG 24 hr tablet; Take 1 tablet by mouth Daily.  Dispense: 30 tablet; Refill: 5  -     DULoxetine (CYMBALTA) 60 MG capsule; Take 1 capsule by mouth Daily.  Dispense: 30 capsule; Refill: 5    3. Hypothyroidism, unspecified type  Overview:  Stable on levothyroxine, continue current dose    Orders:  -     levothyroxine (Synthroid) 75 MCG tablet; Take 1 tablet by mouth Daily.  Dispense: 30 tablet; Refill: 5  -     TSH+Free T4; Future    4. Type 2 diabetes mellitus with hyperglycemia, without long-term current use of insulin  Overview:  Stable and well-controlled, continue current medication    Orders:  -     metFORMIN (GLUCOPHAGE) 500 MG tablet; Take 1 tablet by mouth 2 (Two) Times a Day With Meals.  Dispense: 60 tablet; Refill: 5  -     Comprehensive Metabolic Panel; Future  -     Lipid Panel; Future  -     Hemoglobin A1c; Future  -     Microalbumin / Creatinine Urine Ratio - Urine, Clean Catch; Future  -     TSH+Free T4; Future  -     CBC w AUTO Differential; Future  -     Magnesium; Future  -     Vitamin D 25 hydroxy; Future    5. Left-sided low back pain with left-sided sciatica, unspecified chronicity  Comments:  Patient declines  imaging or referral at present, will follow-up if no improvement with medication, side effects and admin discussed  Orders:  -     tiZANidine (ZANAFLEX) 4 MG tablet; Take 1 tablet by mouth Every 8 (Eight) Hours As Needed for Muscle Spasms.  Dispense: 30 tablet; Refill: 1    6. Hyperlipidemia, unspecified hyperlipidemia type  Overview:  Stable on Crestor, continue current medication    Orders:  -     rosuvastatin (Crestor) 5 MG tablet; Take 1 tablet by mouth Daily.  Dispense: 30 tablet; Refill: 5    7. Vitamin D deficiency  -     Vitamin D 25 hydroxy; Future    8. Colon cancer screening declined    9. Anxiety  Assessment & Plan:  Discussed hydroxyzine for as needed use, did stressed to patient that this can cause drowsiness so may not be appropriate to drive while taking.  Side effects administration of medication discussed at length, patient will follow-up if no improvement, otherwise follow-up in 3 months    Orders:  -     hydrOXYzine (ATARAX) 25 MG tablet; 1/2 to 1 tab PO  QD PRN  Dispense: 30 tablet; Refill: 0    10. Primary hypertension  Assessment & Plan:  Discussed with patient blood pressure has been elevated last couple of checks, after discussion we have decided to go ahead and start low-dose lisinopril, side effects administration medication discussed, we will follow-up in 3 months for reevaluation, patient to monitor her blood pressure at home.  Goal 130/80 or less, if blood pressure not improving please let me know    Orders:  -     lisinopril (PRINIVIL,ZESTRIL) 10 MG tablet; Take 1 tablet by mouth Daily.  Dispense: 90 tablet; Refill: 0         Tobacco Use: Low Risk  (7/29/2025)    Patient History     Smoking Tobacco Use: Never     Smokeless Tobacco Use: Never     Passive Exposure: Never       Follow Up     Return in about 3 months (around 10/29/2025), or if symptoms worsen or fail to improve.        Patient was given instructions and counseling regarding her condition or for health maintenance advice.  Please see specific information pulled into the AVS if appropriate.   I have reviewed information obtained and documented by others and I have confirmed the accuracy of this documented note.    DESMOND Haro

## 2025-07-29 NOTE — ASSESSMENT & PLAN NOTE
Discussed hydroxyzine for as needed use, did stressed to patient that this can cause drowsiness so may not be appropriate to drive while taking.  Side effects administration of medication discussed at length, patient will follow-up if no improvement, otherwise follow-up in 3 months

## 2025-08-19 ENCOUNTER — LAB (OUTPATIENT)
Dept: LAB | Facility: HOSPITAL | Age: 60
End: 2025-08-19
Payer: COMMERCIAL

## 2025-08-19 DIAGNOSIS — E03.9 HYPOTHYROIDISM, UNSPECIFIED TYPE: ICD-10-CM

## 2025-08-19 DIAGNOSIS — E55.9 VITAMIN D DEFICIENCY: ICD-10-CM

## 2025-08-19 DIAGNOSIS — E11.65 TYPE 2 DIABETES MELLITUS WITH HYPERGLYCEMIA, WITHOUT LONG-TERM CURRENT USE OF INSULIN: ICD-10-CM

## 2025-08-19 DIAGNOSIS — R25.2 LEG CRAMP: ICD-10-CM

## 2025-08-19 LAB
25(OH)D3 SERPL-MCNC: 50.8 NG/ML (ref 30–100)
ALBUMIN SERPL-MCNC: 3.9 G/DL (ref 3.5–5.2)
ALBUMIN UR-MCNC: 5 MG/DL
ALBUMIN/GLOB SERPL: 1.3 G/DL
ALP SERPL-CCNC: 113 U/L (ref 39–117)
ALT SERPL W P-5'-P-CCNC: 28 U/L (ref 1–33)
ANION GAP SERPL CALCULATED.3IONS-SCNC: 15 MMOL/L (ref 5–15)
AST SERPL-CCNC: 28 U/L (ref 1–32)
BASOPHILS # BLD AUTO: 0.03 10*3/MM3 (ref 0–0.2)
BASOPHILS NFR BLD AUTO: 0.5 % (ref 0–1.5)
BILIRUB SERPL-MCNC: 0.3 MG/DL (ref 0–1.2)
BUN SERPL-MCNC: 10 MG/DL (ref 6–20)
BUN/CREAT SERPL: 9.3 (ref 7–25)
CALCIUM SPEC-SCNC: 9.4 MG/DL (ref 8.6–10.5)
CHLORIDE SERPL-SCNC: 105 MMOL/L (ref 98–107)
CHOLEST SERPL-MCNC: 176 MG/DL (ref 0–200)
CO2 SERPL-SCNC: 19 MMOL/L (ref 22–29)
CREAT SERPL-MCNC: 1.08 MG/DL (ref 0.57–1)
CREAT UR-MCNC: 285.2 MG/DL
DEPRECATED RDW RBC AUTO: 41.6 FL (ref 37–54)
EGFRCR SERPLBLD CKD-EPI 2021: 59.7 ML/MIN/1.73
EOSINOPHIL # BLD AUTO: 0.07 10*3/MM3 (ref 0–0.4)
EOSINOPHIL NFR BLD AUTO: 1.2 % (ref 0.3–6.2)
ERYTHROCYTE [DISTWIDTH] IN BLOOD BY AUTOMATED COUNT: 11.9 % (ref 12.3–15.4)
GLOBULIN UR ELPH-MCNC: 3.1 GM/DL
GLUCOSE SERPL-MCNC: 144 MG/DL (ref 65–99)
HBA1C MFR BLD: 6.1 % (ref 4.8–5.6)
HCT VFR BLD AUTO: 42.4 % (ref 34–46.6)
HDLC SERPL-MCNC: 64 MG/DL (ref 40–60)
HGB BLD-MCNC: 14.5 G/DL (ref 12–15.9)
IMM GRANULOCYTES # BLD AUTO: 0.01 10*3/MM3 (ref 0–0.05)
IMM GRANULOCYTES NFR BLD AUTO: 0.2 % (ref 0–0.5)
LDLC SERPL CALC-MCNC: 94 MG/DL (ref 0–100)
LDLC/HDLC SERPL: 1.44 {RATIO}
LYMPHOCYTES # BLD AUTO: 1.71 10*3/MM3 (ref 0.7–3.1)
LYMPHOCYTES NFR BLD AUTO: 29.8 % (ref 19.6–45.3)
MAGNESIUM SERPL-MCNC: 2 MG/DL (ref 1.6–2.6)
MCH RBC QN AUTO: 32.7 PG (ref 26.6–33)
MCHC RBC AUTO-ENTMCNC: 34.2 G/DL (ref 31.5–35.7)
MCV RBC AUTO: 95.7 FL (ref 79–97)
MICROALBUMIN/CREAT UR: 17.5 MG/G (ref 0–29)
MONOCYTES # BLD AUTO: 0.38 10*3/MM3 (ref 0.1–0.9)
MONOCYTES NFR BLD AUTO: 6.6 % (ref 5–12)
NEUTROPHILS NFR BLD AUTO: 3.54 10*3/MM3 (ref 1.7–7)
NEUTROPHILS NFR BLD AUTO: 61.7 % (ref 42.7–76)
NRBC BLD AUTO-RTO: 0 /100 WBC (ref 0–0.2)
PLATELET # BLD AUTO: 375 10*3/MM3 (ref 140–450)
PMV BLD AUTO: 10.5 FL (ref 6–12)
POTASSIUM SERPL-SCNC: 4.5 MMOL/L (ref 3.5–5.2)
PROT SERPL-MCNC: 7 G/DL (ref 6–8.5)
RBC # BLD AUTO: 4.43 10*6/MM3 (ref 3.77–5.28)
SODIUM SERPL-SCNC: 139 MMOL/L (ref 136–145)
T4 FREE SERPL-MCNC: 1.1 NG/DL (ref 0.92–1.68)
TRIGL SERPL-MCNC: 98 MG/DL (ref 0–150)
TSH SERPL DL<=0.05 MIU/L-ACNC: 3.72 UIU/ML (ref 0.27–4.2)
VLDLC SERPL-MCNC: 18 MG/DL (ref 5–40)
WBC NRBC COR # BLD AUTO: 5.74 10*3/MM3 (ref 3.4–10.8)

## 2025-08-19 PROCEDURE — 83735 ASSAY OF MAGNESIUM: CPT

## 2025-08-19 PROCEDURE — 82306 VITAMIN D 25 HYDROXY: CPT

## 2025-08-19 PROCEDURE — 80061 LIPID PANEL: CPT

## 2025-08-19 PROCEDURE — 82570 ASSAY OF URINE CREATININE: CPT

## 2025-08-19 PROCEDURE — 80050 GENERAL HEALTH PANEL: CPT

## 2025-08-19 PROCEDURE — 83036 HEMOGLOBIN GLYCOSYLATED A1C: CPT

## 2025-08-19 PROCEDURE — 82043 UR ALBUMIN QUANTITATIVE: CPT

## 2025-08-19 PROCEDURE — 84439 ASSAY OF FREE THYROXINE: CPT

## 2025-08-19 PROCEDURE — 36415 COLL VENOUS BLD VENIPUNCTURE: CPT

## (undated) DEVICE — Device

## (undated) DEVICE — GLV SURG SENSICARE SLT PF LF 6.5 STRL

## (undated) DEVICE — SKIN PREP TRAY W/CHG: Brand: MEDLINE INDUSTRIES, INC.

## (undated) DEVICE — BASIC SINGLE BASIN-LF: Brand: MEDLINE INDUSTRIES, INC.

## (undated) DEVICE — FIBR LASR HOLMIUM COMPAT 272MH DISP

## (undated) DEVICE — SYS IRR PUMP SGL ACTN VAC SYR 10CC

## (undated) DEVICE — CYSTO PACK: Brand: MEDLINE INDUSTRIES, INC.

## (undated) DEVICE — GW SUREGLIDE FLXTIP STR/TP .035 3X150CM EA/5

## (undated) DEVICE — GW SUREGLIDE FLXTIP ANG/TP .038 3X150CM EA/5

## (undated) DEVICE — DUAL LUMEN URETERAL CATHETER

## (undated) DEVICE — SOL IRR NACL 0.9PCT 3000ML

## (undated) DEVICE — ENDOSCOPIC VALVE WITH ADAPTER.: Brand: SURSEAL® II

## (undated) DEVICE — Y-TYPE TUR/BLADDER IRRIGATION SET, REGULATING CLAMP

## (undated) DEVICE — GOWN,REINFORCE,POLY,SIRUS,BREATH SLV,XLG: Brand: MEDLINE

## (undated) DEVICE — URETERAL ACCESS SHEATH SET: Brand: NAVIGATOR HD

## (undated) DEVICE — NITINOL STONE RETRIEVAL BASKET: Brand: ESCAPE